# Patient Record
Sex: FEMALE | Race: WHITE | NOT HISPANIC OR LATINO | URBAN - METROPOLITAN AREA
[De-identification: names, ages, dates, MRNs, and addresses within clinical notes are randomized per-mention and may not be internally consistent; named-entity substitution may affect disease eponyms.]

---

## 2019-05-29 ENCOUNTER — OUTPATIENT (OUTPATIENT)
Dept: OUTPATIENT SERVICES | Facility: HOSPITAL | Age: 34
LOS: 1 days | End: 2019-05-29
Payer: COMMERCIAL

## 2019-05-29 VITALS
SYSTOLIC BLOOD PRESSURE: 114 MMHG | TEMPERATURE: 99 F | DIASTOLIC BLOOD PRESSURE: 76 MMHG | OXYGEN SATURATION: 99 % | HEART RATE: 82 BPM | HEIGHT: 64 IN | RESPIRATION RATE: 16 BRPM | WEIGHT: 145.95 LBS

## 2019-05-29 DIAGNOSIS — Z98.890 OTHER SPECIFIED POSTPROCEDURAL STATES: Chronic | ICD-10-CM

## 2019-05-29 DIAGNOSIS — N84.0 POLYP OF CORPUS UTERI: ICD-10-CM

## 2019-05-29 DIAGNOSIS — Z98.891 HISTORY OF UTERINE SCAR FROM PREVIOUS SURGERY: Chronic | ICD-10-CM

## 2019-05-29 DIAGNOSIS — Z01.818 ENCOUNTER FOR OTHER PREPROCEDURAL EXAMINATION: ICD-10-CM

## 2019-05-29 DIAGNOSIS — Q23.1 CONGENITAL INSUFFICIENCY OF AORTIC VALVE: ICD-10-CM

## 2019-05-29 LAB
HCG SERPL-ACNC: <1 MIU/ML — SIGNIFICANT CHANGE UP
HCT VFR BLD CALC: 42.1 % — SIGNIFICANT CHANGE UP (ref 34.5–45)
HGB BLD-MCNC: 14 G/DL — SIGNIFICANT CHANGE UP (ref 11.5–15.5)
MCHC RBC-ENTMCNC: 28.1 PG — SIGNIFICANT CHANGE UP (ref 27–34)
MCHC RBC-ENTMCNC: 33.3 GM/DL — SIGNIFICANT CHANGE UP (ref 32–36)
MCV RBC AUTO: 84.5 FL — SIGNIFICANT CHANGE UP (ref 80–100)
NRBC # BLD: 0 /100 WBCS — SIGNIFICANT CHANGE UP (ref 0–0)
PLATELET # BLD AUTO: 212 K/UL — SIGNIFICANT CHANGE UP (ref 150–400)
RBC # BLD: 4.98 M/UL — SIGNIFICANT CHANGE UP (ref 3.8–5.2)
RBC # FLD: 13.3 % — SIGNIFICANT CHANGE UP (ref 10.3–14.5)
WBC # BLD: 6.84 K/UL — SIGNIFICANT CHANGE UP (ref 3.8–10.5)
WBC # FLD AUTO: 6.84 K/UL — SIGNIFICANT CHANGE UP (ref 3.8–10.5)

## 2019-05-29 PROCEDURE — 86900 BLOOD TYPING SEROLOGIC ABO: CPT

## 2019-05-29 PROCEDURE — G0463: CPT

## 2019-05-29 PROCEDURE — 86850 RBC ANTIBODY SCREEN: CPT

## 2019-05-29 PROCEDURE — 36415 COLL VENOUS BLD VENIPUNCTURE: CPT

## 2019-05-29 PROCEDURE — 84702 CHORIONIC GONADOTROPIN TEST: CPT

## 2019-05-29 PROCEDURE — 85027 COMPLETE CBC AUTOMATED: CPT

## 2019-05-29 PROCEDURE — 86901 BLOOD TYPING SEROLOGIC RH(D): CPT

## 2019-05-29 NOTE — H&P PST ADULT - NSICDXPROBLEM_GEN_ALL_CORE_FT
PROBLEM DIAGNOSES  Problem: Polyp of corpus uteri  Assessment and Plan: scheduled for hysteroscopic polypectomy using myosure device on 5/31 with Dr. Kim    Problem: Pre-op evaluation  Assessment and Plan: Labs - CBC, HCG and T&S  No MC needed  Pre op and Hibiclens instructions reviewed and given. Take routine am meds DOS with sip of water. Avoid NSAIDs and OTC supplements. Verbalized understanding     Problem: Bicuspid aortic valve  Assessment and Plan: Stable, asymptomatic; Last Cardiac note with testing requested. PROBLEM DIAGNOSES  Problem: Bicuspid aortic valve  Assessment and Plan: Stable, asymptomatic; Last Cardiac note with testing requested.     Problem: Polyp of corpus uteri  Assessment and Plan: scheduled for hysteroscopic polypectomy using myosure device on 5/31 with Dr. Kim    Problem: Pre-op evaluation  Assessment and Plan: Labs - CBC, HCG and T&S  No MC needed  Pre op instructions reviewed and given. Take routine am meds DOS with sip of water. Avoid NSAIDs and OTC supplements. Verbalized understanding

## 2019-05-29 NOTE — H&P PST ADULT - NSICDXPASTSURGICALHX_GEN_ALL_CORE_FT
PAST SURGICAL HISTORY:  H/O bilateral breast reduction surgery     H/O  section     History of D&C Missed AB 2019    History of repair of ACL Left 2000

## 2019-05-29 NOTE — H&P PST ADULT - NSICDXPASTMEDICALHX_GEN_ALL_CORE_FT
PAST MEDICAL HISTORY:  Bicuspid aortic valve Asymptomatic; Followed annually by a cardiologist    GERD (gastroesophageal reflux disease)     Polyp of corpus uteri

## 2019-05-29 NOTE — H&P PST ADULT - ASSESSMENT
35 yo female with a uterine polyp scheduled for hysteroscopic polypectomy using myosure device on 5/31 with Dr. Kim

## 2019-05-29 NOTE — H&P PST ADULT - HISTORY OF PRESENT ILLNESS
35 yo female with a bicuspid aortic valve presents to [facility] scheduled for hysteroscopic polypectomy using myosure device on  with Dr. Kim.  LMP 3/11/2019. S/P missed AB x 2 in the last 6 months. Reports that she has a uterine polyp found by her infertility doctor during a gyn exam. Denies abnormal bleeding and discomfort.

## 2019-05-29 NOTE — H&P PST ADULT - NSANTHOSAYNRD_GEN_A_CORE
No. FERCHO screening performed.  STOP BANG Legend: 0-2 = LOW Risk; 3-4 = INTERMEDIATE Risk; 5-8 = HIGH Risk

## 2019-05-30 ENCOUNTER — TRANSCRIPTION ENCOUNTER (OUTPATIENT)
Age: 34
End: 2019-05-30

## 2019-05-30 RX ORDER — SODIUM CHLORIDE 9 MG/ML
1000 INJECTION, SOLUTION INTRAVENOUS
Refills: 0 | Status: DISCONTINUED | OUTPATIENT
Start: 2019-05-31 | End: 2019-05-31

## 2019-05-30 RX ORDER — ACETAMINOPHEN 500 MG
975 TABLET ORAL ONCE
Refills: 0 | Status: COMPLETED | OUTPATIENT
Start: 2019-05-31 | End: 2019-05-31

## 2019-05-31 ENCOUNTER — RESULT REVIEW (OUTPATIENT)
Age: 34
End: 2019-05-31

## 2019-05-31 ENCOUNTER — OUTPATIENT (OUTPATIENT)
Dept: OUTPATIENT SERVICES | Facility: HOSPITAL | Age: 34
LOS: 1 days | End: 2019-05-31
Payer: COMMERCIAL

## 2019-05-31 VITALS
TEMPERATURE: 99 F | DIASTOLIC BLOOD PRESSURE: 72 MMHG | SYSTOLIC BLOOD PRESSURE: 111 MMHG | HEART RATE: 67 BPM | OXYGEN SATURATION: 98 % | RESPIRATION RATE: 15 BRPM

## 2019-05-31 VITALS
WEIGHT: 145.95 LBS | TEMPERATURE: 98 F | HEART RATE: 61 BPM | OXYGEN SATURATION: 100 % | HEIGHT: 64 IN | SYSTOLIC BLOOD PRESSURE: 117 MMHG | DIASTOLIC BLOOD PRESSURE: 74 MMHG | RESPIRATION RATE: 14 BRPM

## 2019-05-31 DIAGNOSIS — Z98.890 OTHER SPECIFIED POSTPROCEDURAL STATES: Chronic | ICD-10-CM

## 2019-05-31 DIAGNOSIS — N84.0 POLYP OF CORPUS UTERI: ICD-10-CM

## 2019-05-31 DIAGNOSIS — Z98.891 HISTORY OF UTERINE SCAR FROM PREVIOUS SURGERY: Chronic | ICD-10-CM

## 2019-05-31 PROCEDURE — 88305 TISSUE EXAM BY PATHOLOGIST: CPT | Mod: 26

## 2019-05-31 PROCEDURE — 88305 TISSUE EXAM BY PATHOLOGIST: CPT

## 2019-05-31 PROCEDURE — 58558 HYSTEROSCOPY BIOPSY: CPT

## 2019-05-31 RX ORDER — HYDROMORPHONE HYDROCHLORIDE 2 MG/ML
0.5 INJECTION INTRAMUSCULAR; INTRAVENOUS; SUBCUTANEOUS
Refills: 0 | Status: DISCONTINUED | OUTPATIENT
Start: 2019-05-31 | End: 2019-05-31

## 2019-05-31 RX ORDER — SODIUM CHLORIDE 9 MG/ML
1000 INJECTION, SOLUTION INTRAVENOUS
Refills: 0 | Status: DISCONTINUED | OUTPATIENT
Start: 2019-05-31 | End: 2019-05-31

## 2019-05-31 RX ORDER — OXYCODONE HYDROCHLORIDE 5 MG/1
5 TABLET ORAL ONCE
Refills: 0 | Status: DISCONTINUED | OUTPATIENT
Start: 2019-05-31 | End: 2019-05-31

## 2019-05-31 RX ORDER — ONDANSETRON 8 MG/1
4 TABLET, FILM COATED ORAL ONCE
Refills: 0 | Status: DISCONTINUED | OUTPATIENT
Start: 2019-05-31 | End: 2019-05-31

## 2019-05-31 RX ADMIN — Medication 975 MILLIGRAM(S): at 12:47

## 2019-05-31 RX ADMIN — SODIUM CHLORIDE 100 MILLILITER(S): 9 INJECTION, SOLUTION INTRAVENOUS at 15:34

## 2019-05-31 RX ADMIN — HYDROMORPHONE HYDROCHLORIDE 0.5 MILLIGRAM(S): 2 INJECTION INTRAMUSCULAR; INTRAVENOUS; SUBCUTANEOUS at 15:36

## 2019-05-31 RX ADMIN — SODIUM CHLORIDE 75 MILLILITER(S): 9 INJECTION, SOLUTION INTRAVENOUS at 12:48

## 2019-05-31 NOTE — BRIEF OPERATIVE NOTE - NSICDXBRIEFPROCEDURE_GEN_ALL_CORE_FT
PROCEDURES:  Hysteroscopic polypectomy using MyoSure tissue removal system 31-May-2019 15:44:32  Александр Kim

## 2019-05-31 NOTE — ASU DISCHARGE PLAN (ADULT/PEDIATRIC) - CARE PROVIDER_API CALL
Александр Kim)  Obstetrics and Gynecology; Reproductive EndoInfertility  [address] [place] [address]  Phone: [phone]  Fax: [phone]  Follow Up Time:

## 2019-06-04 LAB — SURGICAL PATHOLOGY STUDY: SIGNIFICANT CHANGE UP

## 2019-07-22 NOTE — ASU PREOP CHECKLIST - WEIGHT IN KG
Received fax from [facility]- (PCP Dr Hyatt)- pt seen & put on Suprenza (Phentermine)  30 mg (ODT form) which is not available.  The Phentermine is available in capsules.  Is it ok to switch to capsules?  Last seen by Dr Hyatt 7/17/19.  Next appointment scheduled 10/21/19.  
Yes, prescription approved.  
66.2

## 2019-12-31 NOTE — H&P PST ADULT - SOURCE OF INFORMATION, PROFILE
[facility], [place]  [address] [place] [address]  [phone]  Date:    Dec 31, 2019  Provider:  Edyta Bangura MD  Patient:   Navya William    :  1970    Age:  49 Years old    Sex:  Female    PROCEDURE REPORT    Chief Complaint:   The patient is a 49 year old female with the chief complaint of: back pain.    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    Indications / Pre-Operative Plan:  Patient is on opioids and reports taking them as prescribed. Patient is able to function and complete activities of daily living. Patient displays no aberrant behaviors. Patient denies side effects.   The patient has persistent axial, non radicular pain with paraspinal tenderness, and has failed a variety of conservative therapeutic interventions including activity modification, physical therapy, and medications. The patient has had diagnostic response with greater than 100% relief and improvement in function for the duration of the two comparative anesthetic medial branch blocks and presents for therapeutic Radiofrequency medial branch neurolysis. The risks, alternatives and benefits were explained to the patient in detail.  The patient agreed with the plan.   Patient was examined by me prior to the procedure. Examination of heart, lung and mental status were all within acceptable limits. The patient has been assessed, examined and cleared for the planned procedure and level of anesthesia in an ambulatory surgery center.    Description of Procedure:    RF Lumbar Facet  Denervation  After obtaining informed consent including discussion of risks, benefits and alternatives, the patient was brought to the procedure room. The patient was placed in the prone position. Appropriate time out was called. The area was prepped and draped in a sterile fashion. Utilizing fluoroscopy the target level(s) were identified and made prominent. After anesthetizing the skin and subcutaneous tissues with 2 ml of 1% Lidocaine, a 18 gauge radiofrequency cannula measuring 15 cm in length with 10 mm active tip  was placed at the base of the superior articular process at right L1 level, which was confirmed under AP, oblique and lateral views.    Same procedure outlined above was performed at right L2, L3 levels. No paresthesia(s) was/were noted at any of the levels. Physiological sensorimotor stimulation was applied, and it was felt in the back without any sensorimotor stimulation being felt in the lower extremity. Subsequently, 1 ml of 2% of lidocaine was injected at each target point. Prior to performing the RF denervation, cannula placement was reconfirmed under fluoroscopic guidance. Lateral, PA and oblique views confirmed no impingement of neuroforamen(a). RF neurotomy was performed at each level at 80 degrees centigrade for 90 seconds. 1 more lesion was performed at each level by slightly repositioning the cannula to complete the neurotomy at these levels.    Radio frequency neurolysis was performed on right T12, L1, L2 medial/dorsal branch nerves. All the needles were withdrawn, and there were no noted complications. Patient tolerated the procedure well and was transported / observed before being discharged in satisfactory condition.      Post-Operative Plan:  Today I performed a right T12, L1, L2 Lumbar radiofrequency ablation. We will repeat on the left side 1-2 weeks if clinically indicated. Already scheduled on 1/7/19.     UDS 10/16/19 results consistent    Continue Hydrocodone 7.5/325 mg 1 tablet  BID PRN. Refill given today x 1 month.    Continue Tramadol 50 mg take 1-2 tablets TID  Maximum 6 tablets/day. Refill given today x 1 month    Did see Dr. Winston for consult-no surgical intervention    Uses OTC Ibuprofen    Risks/benefits/alternatives discussed with patient. Patient agrees with outlined plan. Patients questions/concerns answered at this time and patient will contact us with any further questions.    IHaily Bruno Medical scribe, scribed the procedure, assessment and plan in the EHR for Edyta Bangura MD who saw, evaluated and developed the plan of care.    Patient has been prescribed the following medication(s) today:  Medication Description Start Date Stop Date   tramadol 50 mg tablet take 1-2 tablet by oral route  every 6 hours as needed MAX 6day 12/31/2019 01/29/2020   hydrocodone 7.5 mg-acetaminophen 325 mg tablet take 1 tablet by oral route  every 12 hours as needed for pain 12/31/2019 01/29/2020     .    Edyta Bangura MD        Electronically signed by Edyta Bangura MD on 12/31/2019 09:36 AM     patient

## 2020-07-27 ENCOUNTER — EMERGENCY (EMERGENCY)
Facility: HOSPITAL | Age: 35
LOS: 1 days | Discharge: HOME | End: 2020-07-27
Attending: EMERGENCY MEDICINE | Admitting: EMERGENCY MEDICINE
Payer: COMMERCIAL

## 2020-07-27 VITALS
TEMPERATURE: 98 F | OXYGEN SATURATION: 98 % | RESPIRATION RATE: 16 BRPM | SYSTOLIC BLOOD PRESSURE: 115 MMHG | DIASTOLIC BLOOD PRESSURE: 78 MMHG | HEART RATE: 78 BPM

## 2020-07-27 VITALS
DIASTOLIC BLOOD PRESSURE: 83 MMHG | HEART RATE: 80 BPM | HEIGHT: 64 IN | SYSTOLIC BLOOD PRESSURE: 117 MMHG | RESPIRATION RATE: 16 BRPM | WEIGHT: 149.91 LBS | OXYGEN SATURATION: 98 % | TEMPERATURE: 98 F

## 2020-07-27 DIAGNOSIS — Z98.890 OTHER SPECIFIED POSTPROCEDURAL STATES: Chronic | ICD-10-CM

## 2020-07-27 DIAGNOSIS — Z98.891 HISTORY OF UTERINE SCAR FROM PREVIOUS SURGERY: Chronic | ICD-10-CM

## 2020-07-27 PROCEDURE — 70450 CT HEAD/BRAIN W/O DYE: CPT | Mod: 26

## 2020-07-27 PROCEDURE — 99284 EMERGENCY DEPT VISIT MOD MDM: CPT

## 2020-07-27 PROCEDURE — 99284 EMERGENCY DEPT VISIT MOD MDM: CPT | Mod: 25

## 2020-07-27 PROCEDURE — 70450 CT HEAD/BRAIN W/O DYE: CPT

## 2020-07-27 NOTE — ED ADULT NURSE NOTE - NSHOSCREENINGQ1_ED_ALL_ED
Instructed patient/caregiver to follow-up with primary care physician/Care for catheter as per unit/ICU protocols/Verbal/written post procedure instructions were given to patient/caregiver/Keep the cast/splint/dressing clean and dry No

## 2020-07-27 NOTE — ED ADULT NURSE NOTE - CHPI ED NUR SYMPTOMS NEG
bumped/no blurred vision/no change in level of consciousness/no confusion/no dizziness/no loss of consciousness/no nausea/no seizure/no syncope/no vomiting/no weakness

## 2020-07-27 NOTE — ED PROVIDER NOTE - CHPI ED SYMPTOMS NEG
no blurred vision/no confusion/no dizziness/no loss of consciousness/no nausea/no vomiting/no weakness/no change in level of consciousness

## 2020-07-27 NOTE — ED ADULT NURSE NOTE - OBJECTIVE STATEMENT
pt comes to ED s/p hitting head around 12 noon today on door frame, pt with hx of headaches, pt states she was on the phone wit her husabnd when she noticed her words satrted getting confused, not making sense, lasted about 2 min, my right hand got numb and tingly, I am 22 weeks pregnant " pt comes to ED s/p hitting head around 12 noon today on door frame, pt with hx of headaches, pt states she was on the phone wit her  when she noticed to have difficulty finding her words, getting confused, not making sense, lasted about 2 min, she sat down and noticed her  right hand got numb and tingly, pt is 22 weeks pregnant, no other health issues, states the symptoms resolved shortly after, feels like she is at her baseline now.

## 2020-07-27 NOTE — ED PROVIDER NOTE - PATIENT PORTAL LINK FT
You can access the FollowMyHealth Patient Portal offered by [facility] by registering at the following website: [email or web address]. By joining [email or web address]’s FollowMyHealth portal, you will also be able to view your health information using other applications (apps) compatible with our system.

## 2020-07-27 NOTE — ED PROVIDER NOTE - CLINICAL SUMMARY MEDICAL DECISION MAKING FREE TEXT BOX
pt with head injury with loc. probable concussion. pt with headaches since pregnant. discussed need for ct scan to r/o ich. discussed risks vs benefits. pt advised she spoke to her obgyn and would like ct. will do ct and re-eval

## 2020-07-27 NOTE — ED ADULT NURSE REASSESSMENT NOTE - NS ED NURSE REASSESS COMMENT FT1
pt aware of risks with getting a CT Scan while pregnant. pt wishes to continue with test, consent form signed.

## 2020-07-27 NOTE — ED ADULT NURSE NOTE - PMH
Bicuspid aortic valve  Asymptomatic; Followed annually by a cardiologist  GERD (gastroesophageal reflux disease)    Polyp of corpus uteri

## 2020-07-27 NOTE — ED PROVIDER NOTE - PROGRESS NOTE DETAILS
pt given head injury precautions. advised neuro follow up. All imaging reviewed. all results reviewed with pt including abnormal results. pt given a copy of results. pt advised to follow up with pmd regarding abnormal results. All questions answered and concerns addressed. pt verbalized understanding and agreement with plan and dx. pt advised on next step and when/where to follow up. pt advised on all take home and otc medications. pt advised to follow up with PMD. pt advised to return to ed for worsenng symptoms including fever, cp, sob. will dc.

## 2020-07-27 NOTE — ED ADULT TRIAGE NOTE - CHIEF COMPLAINT QUOTE
" I had a headache x 2 days, but I bumped my head on the door frame today, I started getting my words confused, not making sense, lasted about 2 min, my right hand got numb and tingly, I am 22 weeks pregnant "

## 2020-07-27 NOTE — ED PROVIDER NOTE - NSFOLLOWUPINSTRUCTIONS_ED_ALL_ED_FT
Concussion    WHAT YOU NEED TO KNOW:    A concussion is a mild brain injury. It is usually caused by a bump or blow to the head from a fall, a motor vehicle crash, or a sports injury. Sometimes being shaken forcefully may cause a concussion.    DISCHARGE INSTRUCTIONS:    Have someone call 911 for any of the following:     Someone tries to wake you and cannot do so.      You have a seizure, increasing confusion, or a change in personality.      Your speech becomes slurred, or you have new vision problems.    Return to the emergency department if:     You have sudden and new vision problems.      You have a severe headache that does not go away.      You have arm or leg weakness, numbness, or new problems with coordination.      You have blood or clear fluid coming out of the ears or nose.    Contact your healthcare provider if:     You have nausea or are vomiting.      You feel more sleepy than usual.      Your symptoms get worse.      Your symptoms last longer than 6 weeks after the injury.      You have questions or concerns about your condition or care.    Medicines: You may need any of the following:     Acetaminophen decreases pain and fever. It is available without a doctor's order. Ask how much to take and how often to take it. Follow directions. Read the labels of all other medicines you are using to see if they also contain acetaminophen, or ask your doctor or pharmacist. Acetaminophen can cause liver damage if not taken correctly. Do not use more than 4 grams (4,000 milligrams) total of acetaminophen in one day.       NSAIDs help decrease swelling and pain or fever. This medicine is available with or without a doctor's order. NSAIDs can cause stomach bleeding or kidney problems in certain people. If you take blood thinner medicine, always ask your healthcare provider if NSAIDs are safe for you. Always read the medicine label and follow directions.      Take your medicine as directed. Contact your healthcare provider if you think your medicine is not helping or if you have side effects. Tell him or her if you are allergic to any medicine. Keep a list of the medicines, vitamins, and herbs you take. Include the amounts, and when and why you take them. Bring the list or the pill bottles to follow-up visits. Carry your medicine list with you in case of an emergency.    Self-care: Concussion symptoms usually go away within about 10 days, but they may last longer. The following may be recommended to manage your symptoms:     Rest from physical and mental activities as directed. Mental activities are those that require thinking, concentration, and attention. You will need to rest until your symptoms are gone. Rest will allow you to recover from your concussion. Ask your healthcare provider when you can return to work and other daily activities.      Have someone stay with you for the first 24 hours after your injury. Your healthcare provider should be contacted if your symptoms get worse, or you develop new symptoms.      Do not participate in sports and physical activities until your healthcare provider says it is okay. They could make your symptoms worse or lead to another concussion. Your healthcare provider will tell you when it is okay for you to return to sports or physical activities. Ask for more information about sports concussions.    Prevent another concussion:     Wear protective sports equipment that fits properly. Helmets help decrease your risk for a serious brain injury. Talk to your healthcare provider about ways you can decrease your risk for a concussion if you play sports.      Wear your seatbelt every time you travel. This helps to decrease your risk for a head injury if you are in a car accident.     Follow up with your healthcare provider as directed: Write down your questions so you remember to ask them during your visits.     FOLLOW UP EVALUATION  To ensure optimal concussion recovery, follow up with a doctor specialized in concussion management. An evaluation by a specialty concussion program can ensure timely return to activities.    We offer appointments through the [facility]’s Hotline at [phone].

## 2020-07-27 NOTE — ED PROVIDER NOTE - ATTENDING CONTRIBUTION TO CARE
36 yo F 22 wks pregnant co feeling headache and dazed after bumping forehead on door today. Felt numbness in rt hand earlier now resolved. Feels well.. She spoke with her OB who recommended ER eval. Denies headache, fever, NV, visual disturbance or other symptom. Hx of migraines with scotoma which she had today.  PE VSS, afebrile, no distress. HEENT atraumatic, no scalp tenderness, neck supple, abd soft, nontender, neuro a=ox3, CN 2 - 12 intact. No sensory motor deficits. DTRs +2 thruout.   Gait normal.   Plan - pt unsure if she wants CT scan of brain. Will confer with her  and OBGYN.     I performed a history and physical exam of the patient and discussed their management with the advanced care provider. I reviewed the advanced care provider's note and agree with the documented findings and plan of care. My medical decision making and objective findings are found above.

## 2020-07-27 NOTE — ED ADULT NURSE NOTE - PSH
H/O bilateral breast reduction surgery    H/O  section  2017  History of D&C  Missed AB 2019  History of repair of ACL  Left 2000

## 2020-07-27 NOTE — ED PROVIDER NOTE - CARE PROVIDER_API CALL
Santino Hamm  NEUROLOGY  [address] [place] [id number]  Phone: [phone]  Fax: [phone]  Follow Up Time:

## 2020-07-27 NOTE — ED PROVIDER NOTE - OBJECTIVE STATEMENT
pt is a 36yo female 22w pregnant with no significant pmhx presents s/p head injury. pt reports she hit herself in the pt is a 36yo female 22w pregnant with no significant pmhx presents s/p head injury. pt reports she hit herself in the head with a door without loc. pt reports after incident she got "very nervous" and possibly started slurring her words with right arm tingling that quickly resolved. pt reports she called her ob who advised her to come to ed for eval. pt reports she has been having frequent headaches since pregnancy. pt did not take anything for symptoms.

## 2020-12-23 ENCOUNTER — TRANSCRIPTION ENCOUNTER (OUTPATIENT)
Age: 35
End: 2020-12-23

## 2021-02-15 NOTE — H&P PST ADULT - NSANTHNECKRD_ENT_A_CORE
Patient:   NEHEMIAS HAINES            MRN: [id number]            FIN: [id number]               Age:   87 years     Sex:  Female     :  1931   Associated Diagnoses:   None   Author:   Everton Salgado MD      History of Present Illness   87y F presents with chest pain. Pt reports intermittent CP since april. Tried to control it with nitro. Required two nitros today, 4 yesterday. Due for checkup today and then sent in. No active pain. Pain rates \"bad\", in her chest to the back, radiates to arms. No SOB. SOB in general with activity, at baseline. Pt is supposed to have  a heart cath.       Review of Systems   Constitutional symptoms:  No fever,    Skin symptoms:  No rash,    Eye symptoms:  No recent vision problems,    ENMT symptoms:  No sore throat,    Respiratory symptoms:  No shortness of breath,    Cardiovascular symptoms:  No chest pain,    Gastrointestinal symptoms:  No abdominal pain,    Genitourinary symptoms:  No dysuria,    Musculoskeletal symptoms:  No Joint pain,    Neurologic symptoms:  No headache,       Health Status   Allergies:    Allergic Reactions (Selected)  Medium  Cipro- Rash.  Severity Not Documented  Amoxicillin- No reactions were documented.  Biaxin- No reactions were documented.  Celebrex- No reactions were documented.  Claritin-D- No reactions were documented.  Doxycycline- No reactions were documented.  Imdur- No reactions were documented.  Medrol- No reactions were documented.  Nonallergic Reactions (Selected)  Mild  Demerol- Rash.  Severity Not Documented  Codeine- Makes me sick.  Levaquin- Nausea and n..   Medications:  (Selected)   Documented Medications  Documented  Caltrate 600 + D oral tablet: 1 tab, PO, BID  DuoNeb 2.5-0.5 mg/3 mL inhalation solution: 3 mL, Inhalation, q4hr, q4 to 6 hour prn (acute bronchitis), PRN: as needed for shortness of breath or wheezing  Eliquis 5 mg oral tablet: 5 mg, 1 tab, PO, BID, at 0600 and 1700  Flovent  mcg/inh inhaler: 1 puff,  INHL, BID  Lumigan 0.01% ophthalmic solution: 1 gtt(s), Left Eye, qHS  MiraLax 17 g oral powder: 17 g, PO, qAM, PRN: Constipation  Mucinex 600 mg ER oral tablet: 600 mg, 1 tab, PO, q12hrS  Norco 325 mg-5 mg oral tablet: 1 tab, PO, qHS, PRN: for pain, 0 Refill(s)  Nystop 100,000 units/g topical powder: 1 yuki, TOP, qDay, under the both breast and groin, PRN: Rash  Tikosyn 125 mcg oral capsule: 125 mcg, 1 cap, PO, BID, Home maintenance medication: at 0800 & 1700  Toprol-XL 50 mg oral ER tablet: 50 mg, 1 tab, PO, qAM  Tylenol 325 mg oral tablet: 650 mg, 2 tab, PO, q12hr, PRN: Pain / Fever  Urecholine 10 mg oral tablet: 10 mg, 1 tab, PO, TIDAC  Ventolin HFA 90 mcg/inh inhalation aerosol: 2 puff, Inhalation, q6hr, PRN: for wheezing  atorvastatin 40 mg oral tablet: 40 mg, 1 tab, PO, qPM  clindamycin 150 mg oral capsule: 300 mg, 2 cap, PO, BID, indefinite prophylaxis  famotidine 20 mg oral tablet: 20 mg, 1 tab, PO, qDayACB  ferrous sulfate 325 mg oral tablet: 325 mg, 1 tab, PO, qDayPCB  furosemide 20 mg oral tablet: 20 mg, 1 tab, PO, qDayACB  levothyroxine 137 mcg (0.137 mg) oral tablet: 137 mcg, 1 tab, PO, qDayACB  loperamide 2 mg oral tablet: 2 mg, 1 tab, PO, q4hr, not to exceed 8 capsules, or 16 mg, in 24 hours, PRN: Loose stools.      Past Medical/ Family/ Social History      Medical history   Reviewed as documented in chart.   Surgical history: Reviewed as documented in chart, cardiac stents.   Social history: Tobacco use: Denies.   Problem list:    Active Problems (11)  Anemia   Asthma   Atrial fibrillation   Congestive heart failure   GERD (gastroesophageal reflux disease)   Hip replacement   HTN (hypertension)   Hyperlipidemia   Osteoporosis   SOB (shortness of breath) on exertion   Thyroid activity decreased   .      Physical Examination               Vital Signs   Vital Signs   6/24/2019 11:21          Temperature Oral          98.4 F                             Peripheral Pulse Rate     61 bpm                              Respiratory Rate          18 br/min                             Systolic Blood Pressure   143 mmHg  HI                             Diastolic Blood Pressure  62 mmHg                             Mean Arterial Pressure    89 mmHg                             Oxygen Saturation         99 %  .   Measurements   6/24/2019 11:21          Height                    170.0 cm                             Weight                    123.0 kg  .               Per nurse's notes.   Vital Signs were reviewed.   Pulse Ox > 95% on Room Air which is normal for this patient.   General:  Alert, no acute distress.    Skin:  Warm, dry.    Head:  Normocephalic, atraumatic.    Neck:  Supple, trachea midline.    Eye:  Pupils are equal, round and reactive to light, extraocular movements are intact.    Ears, nose, mouth and throat:  Oral mucosa moist.   Cardiovascular:  Regular rate and rhythm, S1, S2, UE pulses sym.    Respiratory:  Lungs are clear to auscultation, respirations are non-labored, Symmetrical chest wall expansion.    Gastrointestinal:  Soft, Nontender, Non distended.    Musculoskeletal:  Normal ROM, no swelling, no deformity.    Neurological:  Alert and oriented to person, place, time, and situation, No focal neurological deficit observed.    Psychiatric:  Appropriate mood & affect.      Medical Decision Making   Differential Diagnosis:  Differential diagnoses to consider in this patient include: cardiac (ischemia, valve disease, arrhythmia) vs pulmonary (infectious process, pulmonary embolus) vs gastrointestinal (reflux) vs musculoskeletal vs neuropathic vs other process..   Electrocardiogram:  EKG shows normal sinus rhythm, normal axis, with non-specific ST changes interpreted by myself.   .   Cardiac monitor:  Rhythm strip analyzed by myself shows sinus rhythm, normal rate, with no ectopy..   Results review:  Lab results : Lab View   6/24/2019 11:49          BNP                       291 pg/mL  HI    6/24/2019 11:29           Glucose Lvl               103 mg/dL  HI                             BUN                       19 mg/dL                             Creatinine                0.88 mg/dL                             eGFR AfrAmer              >60 mL/min/1.73m2  NA                             eGFR NonAfrAmer           60 mL/min/1.73m2  NA                             Sodium                    130 mEq/L  LOW                             Potassium                 5.0 mEq/L                             Chloride                  96 mEq/L  LOW                             TCO2                      23 mEq/L                             AGAP                      16 mEq/L                             Calcium                   10.1 mg/dL                             Alk Phos                  107 unit/L                             Bili Total                0.5 mg/dL                             Total Protein             7.8 g/dL                             Albumin                   3.7 g/dL                             Globulin_                 4.1 g/dL                             A/G Ratio_                0.9  NA                             AST/GOT                   23 unit/L                             ALT/GPT                   13 unit/L                             Troponin I                0.36 ng/mL  CRIT                             WBC                       12.0 K/cumm  HI                             RBC                       4.73 M/cumm                             Hgb                       13.8 g/dL                             Hct                       42 %                             MCV                       89 FL                             MCH                       29 pg                             MCHC                      33 g/dL                             RDW                       13.3 %                             Platelet                  291 K/cumm                             NRBC                      0.0 %                              Abs Neutro                8.9 K/cumm  HI                             Neutrophil                74 %  NA                             Abs Lymph                 1.4 K/cumm                             Lymphocyte                12 %  NA                             Abs Mono                  1.3 K/cumm  HI                             Monocyte                  11 %  NA                             Immature Gran             0.3 %                             Eosinophil                2 %                             Basophil                  1 %  .      Reexamination/ Reevaluation   Discussed with Dr. Whitlock prior to arrival. Pt wiht several months of cp. Using nitro. No pain in clinic but ekg concerning for MI. Did not want cath lab activation given lack of CP currently.  Labs generally unremarkable. No leukocytosis, no significant electrolyte abnormalities.  Elevated trop. Reviewed cxr.  Discussed with cards, advised heparin, no bolus. ASpirin. Will cath tomorrow.  Pt was discussed with admitting physician/team. Admitted for further care.          Impression and Plan   Diagnosis   1. NSTEMI   Plan   Condition: Stable.    Disposition: Admit time  6/24/2019 12:50:00, Admit to Inpatient Unit.             Electronically Signed On 06.24.2019 12:55  ___________________________________________________   Naomi ALVARADO, Everton Campo     No

## 2021-04-27 DIAGNOSIS — I49.3 VENTRICULAR PREMATURE DEPOLARIZATION: ICD-10-CM

## 2021-04-27 DIAGNOSIS — R06.02 SHORTNESS OF BREATH: ICD-10-CM

## 2021-04-27 DIAGNOSIS — N84.0 POLYP OF CORPUS UTERI: ICD-10-CM

## 2021-04-28 ENCOUNTER — APPOINTMENT (OUTPATIENT)
Dept: CARDIOLOGY | Facility: CLINIC | Age: 36
End: 2021-04-28
Payer: COMMERCIAL

## 2021-04-28 ENCOUNTER — NON-APPOINTMENT (OUTPATIENT)
Age: 36
End: 2021-04-28

## 2021-04-28 VITALS
OXYGEN SATURATION: 99 % | BODY MASS INDEX: 24.75 KG/M2 | DIASTOLIC BLOOD PRESSURE: 74 MMHG | WEIGHT: 145 LBS | HEIGHT: 64 IN | HEART RATE: 70 BPM | SYSTOLIC BLOOD PRESSURE: 109 MMHG

## 2021-04-28 PROCEDURE — 93000 ELECTROCARDIOGRAM COMPLETE: CPT

## 2021-04-28 PROCEDURE — 99072 ADDL SUPL MATRL&STAF TM PHE: CPT

## 2021-04-28 PROCEDURE — 99205 OFFICE O/P NEW HI 60 MIN: CPT

## 2021-04-28 NOTE — REVIEW OF SYSTEMS
[SOB] : shortness of breath [Dyspnea on exertion] : dyspnea during exertion [Palpitations] : palpitations

## 2021-05-24 NOTE — HISTORY OF PRESENT ILLNESS
[FreeTextEntry1] : Ms. Spence is a 36 yr old F with h/o bicuspid AV presents in to establish care in the [facility] program in setting of palpitations - s/p recent  2020 - with recent worsening palpitations\par \par During the last pregnnacy had 2 ER visits for possible ?TIAs/CVAs - negative work up. As per neurologist he thought it was pregnancy related migraines\par \par  She carries a past medical history of bicuspid aortic valve with mild AR, holter detected PVC, ACL repair, \par 2017- TTE showed bicuspid Aortic valve with moderate AR, no evidence of aortic coarctation. \par           Normal  in 2017

## 2021-05-24 NOTE — DISCUSSION/SUMMARY
[FreeTextEntry1] : \par Ms. Spence is a 36 yr old F with h/o bicuspid AV presents in to establish care in the [facility] program in setting of palpitations - s/p recent  2020 - with recent worsening palpitations\par \par During the last pregnnacy had 2 ER visits for possible ?TIAs/CVAs - negative work up. As per neurologist he thought it was pregnancy related migraines\par \par  She carries a past medical history of bicuspid aortic valve with mild AR, holter detected PVC, ACL repair, \par 2017- TTE showed bicuspid Aortic valve with moderate AR, no evidence of aortic coarctation. \par           Normal  in 2017 \par echo with bubbles\par cardiac monitor\par exercise stress test

## 2021-06-07 DIAGNOSIS — Z01.818 ENCOUNTER FOR OTHER PREPROCEDURAL EXAMINATION: ICD-10-CM

## 2021-06-08 ENCOUNTER — APPOINTMENT (OUTPATIENT)
Dept: DISASTER EMERGENCY | Facility: CLINIC | Age: 36
End: 2021-06-08

## 2021-06-09 LAB — SARS-COV-2 N GENE NPH QL NAA+PROBE: NOT DETECTED

## 2021-06-11 ENCOUNTER — APPOINTMENT (OUTPATIENT)
Dept: CV DIAGNOSITCS | Facility: HOSPITAL | Age: 36
End: 2021-06-11

## 2021-06-11 ENCOUNTER — APPOINTMENT (OUTPATIENT)
Dept: CV DIAGNOSTICS | Facility: HOSPITAL | Age: 36
End: 2021-06-11

## 2021-06-11 ENCOUNTER — OUTPATIENT (OUTPATIENT)
Dept: OUTPATIENT SERVICES | Facility: HOSPITAL | Age: 36
LOS: 1 days | End: 2021-06-11
Payer: COMMERCIAL

## 2021-06-11 DIAGNOSIS — Z98.890 OTHER SPECIFIED POSTPROCEDURAL STATES: Chronic | ICD-10-CM

## 2021-06-11 DIAGNOSIS — Q23.1 CONGENITAL INSUFFICIENCY OF AORTIC VALVE: ICD-10-CM

## 2021-06-11 DIAGNOSIS — Z98.891 HISTORY OF UTERINE SCAR FROM PREVIOUS SURGERY: Chronic | ICD-10-CM

## 2021-06-11 PROCEDURE — 93306 TTE W/DOPPLER COMPLETE: CPT

## 2021-06-11 PROCEDURE — 93306 TTE W/DOPPLER COMPLETE: CPT | Mod: 26

## 2021-06-11 PROCEDURE — 93016 CV STRESS TEST SUPVJ ONLY: CPT

## 2021-06-11 PROCEDURE — 93018 CV STRESS TEST I&R ONLY: CPT

## 2021-06-11 PROCEDURE — 93017 CV STRESS TEST TRACING ONLY: CPT

## 2021-06-14 ENCOUNTER — APPOINTMENT (OUTPATIENT)
Dept: CV DIAGNOSTICS | Facility: HOSPITAL | Age: 36
End: 2021-06-14

## 2021-06-14 ENCOUNTER — APPOINTMENT (OUTPATIENT)
Dept: CV DIAGNOSITCS | Facility: HOSPITAL | Age: 36
End: 2021-06-14

## 2021-07-19 ENCOUNTER — TRANSCRIPTION ENCOUNTER (OUTPATIENT)
Age: 36
End: 2021-07-19

## 2021-08-06 ENCOUNTER — TRANSCRIPTION ENCOUNTER (OUTPATIENT)
Age: 36
End: 2021-08-06

## 2021-08-09 ENCOUNTER — TRANSCRIPTION ENCOUNTER (OUTPATIENT)
Age: 36
End: 2021-08-09

## 2021-09-24 ENCOUNTER — TRANSCRIPTION ENCOUNTER (OUTPATIENT)
Age: 36
End: 2021-09-24

## 2021-09-29 ENCOUNTER — EMERGENCY (EMERGENCY)
Facility: HOSPITAL | Age: 36
LOS: 1 days | Discharge: HOME | End: 2021-09-29
Attending: EMERGENCY MEDICINE | Admitting: EMERGENCY MEDICINE
Payer: COMMERCIAL

## 2021-09-29 VITALS
OXYGEN SATURATION: 97 % | DIASTOLIC BLOOD PRESSURE: 86 MMHG | TEMPERATURE: 98 F | SYSTOLIC BLOOD PRESSURE: 129 MMHG | HEART RATE: 78 BPM | WEIGHT: 139.99 LBS | RESPIRATION RATE: 22 BRPM | HEIGHT: 64 IN

## 2021-09-29 VITALS
RESPIRATION RATE: 20 BRPM | SYSTOLIC BLOOD PRESSURE: 106 MMHG | OXYGEN SATURATION: 97 % | TEMPERATURE: 98 F | HEART RATE: 79 BPM | DIASTOLIC BLOOD PRESSURE: 72 MMHG

## 2021-09-29 DIAGNOSIS — Z98.890 OTHER SPECIFIED POSTPROCEDURAL STATES: Chronic | ICD-10-CM

## 2021-09-29 DIAGNOSIS — Z98.891 HISTORY OF UTERINE SCAR FROM PREVIOUS SURGERY: Chronic | ICD-10-CM

## 2021-09-29 LAB
ALBUMIN SERPL ELPH-MCNC: 3.7 G/DL — SIGNIFICANT CHANGE UP (ref 3.3–5)
ALP SERPL-CCNC: 41 U/L — SIGNIFICANT CHANGE UP (ref 30–120)
ALT FLD-CCNC: 28 U/L DA — SIGNIFICANT CHANGE UP (ref 10–60)
ANION GAP SERPL CALC-SCNC: 8 MMOL/L — SIGNIFICANT CHANGE UP (ref 5–17)
AST SERPL-CCNC: 17 U/L — SIGNIFICANT CHANGE UP (ref 10–40)
BASOPHILS # BLD AUTO: 0.04 K/UL — SIGNIFICANT CHANGE UP (ref 0–0.2)
BASOPHILS NFR BLD AUTO: 0.6 % — SIGNIFICANT CHANGE UP (ref 0–2)
BILIRUB SERPL-MCNC: 0.5 MG/DL — SIGNIFICANT CHANGE UP (ref 0.2–1.2)
BUN SERPL-MCNC: 11 MG/DL — SIGNIFICANT CHANGE UP (ref 7–23)
CALCIUM SERPL-MCNC: 8.5 MG/DL — SIGNIFICANT CHANGE UP (ref 8.4–10.5)
CHLORIDE SERPL-SCNC: 107 MMOL/L — SIGNIFICANT CHANGE UP (ref 96–108)
CO2 SERPL-SCNC: 26 MMOL/L — SIGNIFICANT CHANGE UP (ref 22–31)
CREAT SERPL-MCNC: 0.7 MG/DL — SIGNIFICANT CHANGE UP (ref 0.5–1.3)
D DIMER BLD IA.RAPID-MCNC: <150 NG/ML DDU — SIGNIFICANT CHANGE UP
EOSINOPHIL # BLD AUTO: 0.09 K/UL — SIGNIFICANT CHANGE UP (ref 0–0.5)
EOSINOPHIL NFR BLD AUTO: 1.4 % — SIGNIFICANT CHANGE UP (ref 0–6)
GLUCOSE SERPL-MCNC: 114 MG/DL — HIGH (ref 70–99)
HCG UR QL: NEGATIVE — SIGNIFICANT CHANGE UP
HCT VFR BLD CALC: 40.6 % — SIGNIFICANT CHANGE UP (ref 34.5–45)
HGB BLD-MCNC: 13.6 G/DL — SIGNIFICANT CHANGE UP (ref 11.5–15.5)
IMM GRANULOCYTES NFR BLD AUTO: 0.2 % — SIGNIFICANT CHANGE UP (ref 0–1.5)
LYMPHOCYTES # BLD AUTO: 2.39 K/UL — SIGNIFICANT CHANGE UP (ref 1–3.3)
LYMPHOCYTES # BLD AUTO: 37.9 % — SIGNIFICANT CHANGE UP (ref 13–44)
MCHC RBC-ENTMCNC: 30.6 PG — SIGNIFICANT CHANGE UP (ref 27–34)
MCHC RBC-ENTMCNC: 33.5 GM/DL — SIGNIFICANT CHANGE UP (ref 32–36)
MCV RBC AUTO: 91.2 FL — SIGNIFICANT CHANGE UP (ref 80–100)
MONOCYTES # BLD AUTO: 0.37 K/UL — SIGNIFICANT CHANGE UP (ref 0–0.9)
MONOCYTES NFR BLD AUTO: 5.9 % — SIGNIFICANT CHANGE UP (ref 2–14)
NEUTROPHILS # BLD AUTO: 3.4 K/UL — SIGNIFICANT CHANGE UP (ref 1.8–7.4)
NEUTROPHILS NFR BLD AUTO: 54 % — SIGNIFICANT CHANGE UP (ref 43–77)
NRBC # BLD: 0 /100 WBCS — SIGNIFICANT CHANGE UP (ref 0–0)
PLATELET # BLD AUTO: 203 K/UL — SIGNIFICANT CHANGE UP (ref 150–400)
POTASSIUM SERPL-MCNC: 3.8 MMOL/L — SIGNIFICANT CHANGE UP (ref 3.5–5.3)
POTASSIUM SERPL-SCNC: 3.8 MMOL/L — SIGNIFICANT CHANGE UP (ref 3.5–5.3)
PROT SERPL-MCNC: 6.8 G/DL — SIGNIFICANT CHANGE UP (ref 6–8.3)
RBC # BLD: 4.45 M/UL — SIGNIFICANT CHANGE UP (ref 3.8–5.2)
RBC # FLD: 12.7 % — SIGNIFICANT CHANGE UP (ref 10.3–14.5)
SODIUM SERPL-SCNC: 141 MMOL/L — SIGNIFICANT CHANGE UP (ref 135–145)
TROPONIN I SERPL-MCNC: 0 NG/ML — LOW (ref 0.02–0.06)
WBC # BLD: 6.3 K/UL — SIGNIFICANT CHANGE UP (ref 3.8–10.5)
WBC # FLD AUTO: 6.3 K/UL — SIGNIFICANT CHANGE UP (ref 3.8–10.5)

## 2021-09-29 PROCEDURE — 93005 ELECTROCARDIOGRAM TRACING: CPT

## 2021-09-29 PROCEDURE — 99285 EMERGENCY DEPT VISIT HI MDM: CPT

## 2021-09-29 PROCEDURE — 93010 ELECTROCARDIOGRAM REPORT: CPT

## 2021-09-29 PROCEDURE — 36415 COLL VENOUS BLD VENIPUNCTURE: CPT

## 2021-09-29 PROCEDURE — 85379 FIBRIN DEGRADATION QUANT: CPT

## 2021-09-29 PROCEDURE — 81025 URINE PREGNANCY TEST: CPT

## 2021-09-29 PROCEDURE — 80053 COMPREHEN METABOLIC PANEL: CPT

## 2021-09-29 PROCEDURE — 85025 COMPLETE CBC W/AUTO DIFF WBC: CPT

## 2021-09-29 PROCEDURE — 84484 ASSAY OF TROPONIN QUANT: CPT

## 2021-09-29 PROCEDURE — 99283 EMERGENCY DEPT VISIT LOW MDM: CPT | Mod: 25

## 2021-09-29 NOTE — ED PROVIDER NOTE - MDM PATIENT STATEMENT FOR ADDL TREATMENT
Who Is Your Referring Provider?: Maite Abdi Patient with one or more new problems requiring additional work-up/treatment.

## 2021-09-29 NOTE — ED PROVIDER NOTE - OBJECTIVE STATEMENT
Patient had sudden, sharp pain in left upper chest about 2.5 hours PTA. Lasted 2 seconds, but has had some dull discomfort there since with some left arm discomfort as well. Had a second milder, shorter sharp pain in same spot on the way here. No other symptoms. Patient has been having chest pains over the past months. Has seen a cardiologist and has had an EKG, echocardiogram, and exercise stress test, all normal. Patient was told all was well but as she still has pains, is now scheduled for a CT of the coronary arteries. Non-smoker, no HTN/DM/HLD/FH

## 2021-09-29 NOTE — ED ADULT NURSE REASSESSMENT NOTE - NS ED NURSE REASSESS COMMENT FT1
patient was discharged in stable condition, instructions were provided and reviewed, verbalized understanding. IV access was discontinued and vital signs updated. Left AAOx4 was ambulatory and was accompanied by father.
IV access was established and lab specimens sent to lab, patient ambulated to restroom with a steady gait and urine specimen was obtained. Patient remains on continuous cardiac monitoring, pending results and disposition, explanation of care was provided, nursing care ongoing.

## 2021-09-29 NOTE — ED PROVIDER NOTE - PATIENT PORTAL LINK FT
You can access the FollowMyHealth Patient Portal offered by [facility] by registering at the following website: [email or web address]. By joining [employer]’s FollowMyHealth portal, you will also be able to view your health information using other applications (apps) compatible with our system.

## 2021-09-29 NOTE — ED PROVIDER NOTE - CARDIAC, MLM
Normal rate, regular rhythm.  Heart sounds S1, S2.  No murmurs, rubs or gallops. Mild chest wall tenderness in area of pain in left upper chest Normal rate, regular rhythm.  Heart sounds S1, S2.  3/6 systolic murmur. Mild chest wall tenderness in area of pain in left upper chest

## 2021-09-29 NOTE — ED PROVIDER NOTE - PROGRESS NOTE DETAILS
Patient given option of staying for serial troponin. With atypical nature of pain, patient demographics, and recent normal stress test, patient comfortable with D/C, and f/u with her cardiologist

## 2021-09-29 NOTE — ED ADULT NURSE NOTE - OBJECTIVE STATEMENT
Patient presents to ED from home with complaint of left sided upper chest pain that occurred earlier today and lasted 2.5 seconds. Patient reports pain has resolved but still has some mild discomfort. Denies any shortness of breath, dizziness, nausea/vomiting/diarrhea. Skin is dry and normal for ethnicity, no JVD or peripheral edema is noted. Patient regularly follows up with cardiology.

## 2021-09-29 NOTE — ED PROVIDER NOTE - CLINICAL SUMMARY MEDICAL DECISION MAKING FREE TEXT BOX
Patient with atypical pain and recent normal cardiac workup. Has persisting concerns of CAD. Will get EKG/labs. If no significant findings, can f/u with her cardiologist

## 2021-10-01 RX ORDER — ESOMEPRAZOLE MAGNESIUM 40 MG/1
1 CAPSULE, DELAYED RELEASE ORAL
Qty: 0 | Refills: 0 | DISCHARGE

## 2021-10-01 RX ORDER — DOCUSATE SODIUM 100 MG
1 CAPSULE ORAL
Qty: 0 | Refills: 0 | DISCHARGE

## 2021-10-01 RX ORDER — LANSOPRAZOLE 15 MG/1
0 CAPSULE, DELAYED RELEASE ORAL
Qty: 0 | Refills: 0 | DISCHARGE

## 2021-11-12 ENCOUNTER — NON-APPOINTMENT (OUTPATIENT)
Age: 36
End: 2021-11-12

## 2021-11-23 ENCOUNTER — TRANSCRIPTION ENCOUNTER (OUTPATIENT)
Age: 36
End: 2021-11-23

## 2021-11-30 PROBLEM — N84.0 POLYP OF CORPUS UTERI: Chronic | Status: ACTIVE | Noted: 2019-05-29

## 2021-11-30 PROBLEM — K21.9 GASTRO-ESOPHAGEAL REFLUX DISEASE WITHOUT ESOPHAGITIS: Chronic | Status: ACTIVE | Noted: 2019-05-29

## 2021-11-30 PROBLEM — Q23.1 CONGENITAL INSUFFICIENCY OF AORTIC VALVE: Chronic | Status: ACTIVE | Noted: 2019-05-29

## 2021-12-23 ENCOUNTER — TRANSCRIPTION ENCOUNTER (OUTPATIENT)
Age: 36
End: 2021-12-23

## 2022-02-02 ENCOUNTER — OUTPATIENT (OUTPATIENT)
Dept: OUTPATIENT SERVICES | Facility: HOSPITAL | Age: 37
LOS: 1 days | End: 2022-02-02
Payer: COMMERCIAL

## 2022-02-02 ENCOUNTER — APPOINTMENT (OUTPATIENT)
Dept: CT IMAGING | Facility: CLINIC | Age: 37
End: 2022-02-02
Payer: COMMERCIAL

## 2022-02-02 DIAGNOSIS — Z98.890 OTHER SPECIFIED POSTPROCEDURAL STATES: Chronic | ICD-10-CM

## 2022-02-02 DIAGNOSIS — Z98.891 HISTORY OF UTERINE SCAR FROM PREVIOUS SURGERY: Chronic | ICD-10-CM

## 2022-02-02 DIAGNOSIS — R06.02 SHORTNESS OF BREATH: ICD-10-CM

## 2022-02-02 DIAGNOSIS — Q23.1 CONGENITAL INSUFFICIENCY OF AORTIC VALVE: ICD-10-CM

## 2022-02-02 DIAGNOSIS — I49.3 VENTRICULAR PREMATURE DEPOLARIZATION: ICD-10-CM

## 2022-02-02 PROCEDURE — 75574 CT ANGIO HRT W/3D IMAGE: CPT | Mod: 26

## 2022-02-02 PROCEDURE — 75574 CT ANGIO HRT W/3D IMAGE: CPT

## 2022-02-15 ENCOUNTER — NON-APPOINTMENT (OUTPATIENT)
Age: 37
End: 2022-02-15

## 2022-02-27 ENCOUNTER — TRANSCRIPTION ENCOUNTER (OUTPATIENT)
Age: 37
End: 2022-02-27

## 2022-03-08 ENCOUNTER — TRANSCRIPTION ENCOUNTER (OUTPATIENT)
Age: 37
End: 2022-03-08

## 2022-05-11 ENCOUNTER — NON-APPOINTMENT (OUTPATIENT)
Age: 37
End: 2022-05-11

## 2022-05-12 ENCOUNTER — APPOINTMENT (OUTPATIENT)
Dept: CARDIOLOGY | Facility: CLINIC | Age: 37
End: 2022-05-12

## 2022-05-12 NOTE — DISCUSSION/SUMMARY
[FreeTextEntry1] : \par Ms. Spence is a 36 yr old F with h/o bicuspid AV presents in to establish care in the [facility] program in setting of palpitations - s/p recent  2020 - with recent worsening palpitations\par \par During the last pregnnacy had 2 ER visits for possible ?TIAs/CVAs - negative work up. As per neurologist he thought it was pregnancy related migraines\par \par  She carries a past medical history of bicuspid aortic valve with mild AR, holter detected PVC, ACL repair, \par 2017- TTE showed bicuspid Aortic valve with moderate AR, no evidence of aortic coarctation. \par           Normal  in 2017 \par echo with bubbles\par cardiac monitor\par exercise stress test

## 2022-05-12 NOTE — HISTORY OF PRESENT ILLNESS
[FreeTextEntry1] : Ms. Spence is a 36 yr old F with h/o bicuspid AV presents in to establish care in the [facility] program in setting of palpitations - s/p recent  2020 - with recent worsening palpitations\par \par During the last pregnancy had 2 ER visits for possible ?TIAs/CVAs - negative work up. As per neurologist he thought it was pregnancy related migraines\par \par  She carries a past medical history of bicuspid aortic valve with mild AR, holter detected PVC, ACL repair, \par - TTE showed bicuspid Aortic valve with moderate AR, no evidence of aortic coarctation. \par           Normal  in 2017 \par \par \par Interval Note\par May 12, 2022\par \par Ms. 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

## 2022-06-16 ENCOUNTER — NON-APPOINTMENT (OUTPATIENT)
Age: 37
End: 2022-06-16

## 2022-06-18 ENCOUNTER — NON-APPOINTMENT (OUTPATIENT)
Age: 37
End: 2022-06-18

## 2022-06-27 ENCOUNTER — APPOINTMENT (OUTPATIENT)
Dept: CV DIAGNOSITCS | Facility: HOSPITAL | Age: 37
End: 2022-06-27

## 2022-06-27 ENCOUNTER — APPOINTMENT (OUTPATIENT)
Dept: CARDIOLOGY | Facility: CLINIC | Age: 37
End: 2022-06-27

## 2022-07-14 ENCOUNTER — OUTPATIENT (OUTPATIENT)
Dept: OUTPATIENT SERVICES | Facility: HOSPITAL | Age: 37
LOS: 1 days | End: 2022-07-14
Payer: COMMERCIAL

## 2022-07-14 ENCOUNTER — APPOINTMENT (OUTPATIENT)
Dept: CARDIOLOGY | Facility: CLINIC | Age: 37
End: 2022-07-14

## 2022-07-14 ENCOUNTER — APPOINTMENT (OUTPATIENT)
Dept: CV DIAGNOSITCS | Facility: HOSPITAL | Age: 37
End: 2022-07-14

## 2022-07-14 ENCOUNTER — NON-APPOINTMENT (OUTPATIENT)
Age: 37
End: 2022-07-14

## 2022-07-14 VITALS
HEART RATE: 68 BPM | BODY MASS INDEX: 24.59 KG/M2 | DIASTOLIC BLOOD PRESSURE: 74 MMHG | OXYGEN SATURATION: 100 % | HEIGHT: 64 IN | WEIGHT: 144 LBS | SYSTOLIC BLOOD PRESSURE: 111 MMHG

## 2022-07-14 DIAGNOSIS — Z98.890 OTHER SPECIFIED POSTPROCEDURAL STATES: Chronic | ICD-10-CM

## 2022-07-14 DIAGNOSIS — Z98.891 HISTORY OF UTERINE SCAR FROM PREVIOUS SURGERY: Chronic | ICD-10-CM

## 2022-07-14 DIAGNOSIS — R06.02 SHORTNESS OF BREATH: ICD-10-CM

## 2022-07-14 PROCEDURE — 99214 OFFICE O/P EST MOD 30 MIN: CPT

## 2022-07-14 PROCEDURE — 93306 TTE W/DOPPLER COMPLETE: CPT

## 2022-07-14 PROCEDURE — 93306 TTE W/DOPPLER COMPLETE: CPT | Mod: 26

## 2022-07-14 PROCEDURE — 93356 MYOCRD STRAIN IMG SPCKL TRCK: CPT

## 2022-07-14 PROCEDURE — 93000 ELECTROCARDIOGRAM COMPLETE: CPT

## 2022-07-19 DIAGNOSIS — Z00.00 ENCOUNTER FOR GENERAL ADULT MEDICAL EXAMINATION W/OUT ABNORMAL FINDINGS: ICD-10-CM

## 2022-07-25 ENCOUNTER — LABORATORY RESULT (OUTPATIENT)
Age: 37
End: 2022-07-25

## 2022-07-25 ENCOUNTER — OUTPATIENT (OUTPATIENT)
Dept: OUTPATIENT SERVICES | Facility: HOSPITAL | Age: 37
LOS: 1 days | End: 2022-07-25
Payer: COMMERCIAL

## 2022-07-25 ENCOUNTER — APPOINTMENT (OUTPATIENT)
Dept: CV DIAGNOSITCS | Facility: HOSPITAL | Age: 37
End: 2022-07-25

## 2022-07-25 VITALS
HEART RATE: 82 BPM | DIASTOLIC BLOOD PRESSURE: 76 MMHG | OXYGEN SATURATION: 99 % | TEMPERATURE: 99 F | RESPIRATION RATE: 16 BRPM | HEIGHT: 64 IN | SYSTOLIC BLOOD PRESSURE: 114 MMHG | WEIGHT: 145.95 LBS

## 2022-07-25 VITALS
OXYGEN SATURATION: 96 % | SYSTOLIC BLOOD PRESSURE: 101 MMHG | HEART RATE: 66 BPM | RESPIRATION RATE: 15 BRPM | DIASTOLIC BLOOD PRESSURE: 65 MMHG

## 2022-07-25 DIAGNOSIS — Q23.1 CONGENITAL INSUFFICIENCY OF AORTIC VALVE: ICD-10-CM

## 2022-07-25 DIAGNOSIS — Z98.890 OTHER SPECIFIED POSTPROCEDURAL STATES: Chronic | ICD-10-CM

## 2022-07-25 DIAGNOSIS — Z98.891 HISTORY OF UTERINE SCAR FROM PREVIOUS SURGERY: Chronic | ICD-10-CM

## 2022-07-25 PROCEDURE — 93325 DOPPLER ECHO COLOR FLOW MAPG: CPT

## 2022-07-25 PROCEDURE — 93320 DOPPLER ECHO COMPLETE: CPT

## 2022-07-25 PROCEDURE — 93325 DOPPLER ECHO COLOR FLOW MAPG: CPT | Mod: 26

## 2022-07-25 PROCEDURE — 93312 ECHO TRANSESOPHAGEAL: CPT

## 2022-07-25 PROCEDURE — 93320 DOPPLER ECHO COMPLETE: CPT | Mod: 26

## 2022-07-25 PROCEDURE — 93312 ECHO TRANSESOPHAGEAL: CPT | Mod: 26

## 2022-10-11 NOTE — HISTORY OF PRESENT ILLNESS
[FreeTextEntry1] : Ms. Spence is a 37 yr old F with h/o bicuspid AV presents in to follow up in the [facility] program in setting of palpitations - s/p recent  2020 - with recent worsening palpitations\par \par During the last pregnancy had 2 ER visits for possible ?TIAs/CVAs - negative work up. As per neurologist he thought it was pregnancy related migraines\par \par  She carries a past medical history of bicuspid aortic valve with mild AR, holter detected PVC, ACL repair, \par - TTE showed bicuspid Aortic valve with moderate AR, no evidence of aortic coarctation. \par           Normal  in 2017 \par \par \par Interval Note\par 2022\par \par Ms. 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 Spence reports feeling well. Remains active.\par .\par \par Of note, patient underwent her annual echocardiogram today. \par Preliminary reading: slight progression of aortic regurgitation. \par \par \par \par

## 2022-10-11 NOTE — DISCUSSION/SUMMARY
[EKG obtained to assist in diagnosis and management of assessed problem(s)] : EKG obtained to assist in diagnosis and management of assessed problem(s) [FreeTextEntry1] : \par Ms. 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 Encouraged the patient to monitor blood pressure at home, keep a log, and report results back to us for evaluation. Based on results, we will adjust the regimen as necessary.\par - ECG with no acute ischemic changes (stable from prior)\par - Encouraged the patient to find healthy outlets and coping mechanisms to help manage stress, such as physical activity/exercise, reducing workload if possible, spending time with family and friends, engaging in an enjoyable hobby, or using meditation or mindfulness techniques.\par - Encouraged patient to continue healthy exercise and eating habits, focusing on a Mediterranean style of eating and aiming for the recommended 150 minutes per week of moderate physical activity.\par \par \par \par \par \par \par \par

## 2022-10-11 NOTE — ADDENDUM
[FreeTextEntry1] : 10/11/22:\par At the current time, patient is at acceptable risk for the IV vitamin infusion. Patient is asymptomatic with no evidence of acute decompensated heart failure, unstable angina or arrhythmias. No further cardiac work up needed at this time.\par

## 2022-12-01 ENCOUNTER — NON-APPOINTMENT (OUTPATIENT)
Age: 37
End: 2022-12-01

## 2023-01-05 ENCOUNTER — NON-APPOINTMENT (OUTPATIENT)
Age: 38
End: 2023-01-05

## 2023-02-26 ENCOUNTER — NON-APPOINTMENT (OUTPATIENT)
Age: 38
End: 2023-02-26

## 2023-03-27 ENCOUNTER — NON-APPOINTMENT (OUTPATIENT)
Age: 38
End: 2023-03-27

## 2023-05-07 ENCOUNTER — NON-APPOINTMENT (OUTPATIENT)
Age: 38
End: 2023-05-07

## 2023-06-10 ENCOUNTER — EMERGENCY (EMERGENCY)
Facility: HOSPITAL | Age: 38
LOS: 0 days | Discharge: HOME | End: 2023-06-10
Attending: EMERGENCY MEDICINE
Payer: COMMERCIAL

## 2023-06-10 VITALS
WEIGHT: 149.91 LBS | TEMPERATURE: 99 F | OXYGEN SATURATION: 99 % | HEIGHT: 64 IN | SYSTOLIC BLOOD PRESSURE: 105 MMHG | DIASTOLIC BLOOD PRESSURE: 75 MMHG | RESPIRATION RATE: 16 BRPM | HEART RATE: 70 BPM
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
## 2023-06-10 DIAGNOSIS — Z98.890 OTHER SPECIFIED POSTPROCEDURAL STATES: Chronic | ICD-10-CM

## 2023-06-10 DIAGNOSIS — Z98.891 HISTORY OF UTERINE SCAR FROM PREVIOUS SURGERY: Chronic | ICD-10-CM

## 2023-06-10 DIAGNOSIS — Z88.1 ALLERGY STATUS TO OTHER ANTIBIOTIC AGENTS STATUS: ICD-10-CM
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
## 2023-06-10 DIAGNOSIS — K21.9 GASTRO-ESOPHAGEAL REFLUX DISEASE WITHOUT ESOPHAGITIS: ICD-10-CM

## 2023-06-10 DIAGNOSIS — Z87.59 PERSONAL HISTORY OF OTHER COMPLICATIONS OF PREGNANCY, CHILDBIRTH AND THE PUERPERIUM: ICD-10-CM

## 2023-06-10 DIAGNOSIS — S93.401A SPRAIN OF UNSPECIFIED LIGAMENT OF RIGHT ANKLE, INITIAL ENCOUNTER: ICD-10-CM
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
## 2023-06-10 PROCEDURE — 99284 EMERGENCY DEPT VISIT MOD MDM: CPT | Mod: 25

## 2023-06-10 PROCEDURE — 73610 X-RAY EXAM OF ANKLE: CPT | Mod: RT

## 2023-06-10 PROCEDURE — 99283 EMERGENCY DEPT VISIT LOW MDM: CPT | Mod: 25

## 2023-06-10 PROCEDURE — 29515 APPLICATION SHORT LEG SPLINT: CPT

## 2023-06-10 PROCEDURE — 73610 X-RAY EXAM OF ANKLE: CPT | Mod: 26,RT

## 2023-06-10 RX ORDER — IBUPROFEN 200 MG
1 TABLET ORAL
Qty: 20 | Refills: 0
Start: 2023-06-10

## 2023-06-10 NOTE — ED PROVIDER NOTE - OBJECTIVE STATEMENT
37 y/o female w/ a PMHx of GERD and uterine polyp presents to the ED s/p right ankle injury that occurred yesterday while playing tennis. Pt reports she twisted her ankle while playing +inversion injury. Pt states she had mild discomfort yesterday however this am pain started worsening, aggregated by weight bearing and movement. Pain is dull, mild at rest, however sharp during ROM or movement. Pt took 600mg Motrin this am. Pt denies right foot pain, numbness, or tingling. Pt denies any other injury or complaints. Allergies: Bacitracin. PCP: Dr. Winston. 39 y/o female w/ a PMHx of GERD and uterine polyp, BIB pvt car to the ED c/o'ing right ankle injury that occurred yesterday while playing tennis. Pt reports she twisted her ankle while playing, +inversion injury. Pt states she had mild discomfort yesterday however this AM pain much worse, aggravated by weight bearing and movement. Pain is dull, mild at rest, however sharp & more intense during AROM or movement. Pt took 600mg Motrin ~ 8 AM. Pt denies right foot pain, numbness, nor tingling. Pt denies any other injury or complaints.   Allergies: Bacitracin.     PCP: Dr. Winston.

## 2023-06-10 NOTE — ED ADULT NURSE NOTE - NSFALLUNIVINTERV_ED_ALL_ED
Bed/Stretcher in lowest position, wheels locked, appropriate side rails in place/Call bell, personal items and telephone in reach/Instruct patient to call for assistance before getting out of bed/chair/stretcher/Non-slip footwear applied when patient is off stretcher/[place] to call system/Physically safe environment - no spills, clutter or unnecessary equipment/Purposeful proactive rounding/Room/bathroom lighting operational, light cord in reach

## 2023-06-10 NOTE — ED PROVIDER NOTE - MUSCULOSKELETAL, MLM
Right ankle +mild soft tissue swelling overlying lateral malleolus w/ +TTP, decreased ROM due to pain, joint stable, mild ecchymotic discoloration. Right foot nontender, no swelling, DP pulse intact, normal motor/sensroy normal. Right ankle +mild soft tissue swelling overlying lateral malleolus w/ +TTP, decreased AROM due to pain, joint stable, + associated mild ecchymotic discoloration. Right foot nontender, no swelling, DP pulse intact, normal motor/sensory normal, normal CR.

## 2023-06-10 NOTE — ED PROVIDER NOTE - NSFOLLOWUPINSTRUCTIONS_ED_ALL_ED_FT
Wear Right ankle velcro splint as often as possible.  Topical ice pack right ankle today.  Elevate right foot as often as possible, including when in bed at night.  Ibuprofen 600 mg 1 tab every 6 - 8 hours as needed for pain.  Follow up Monday, 6/12, with orthopedist as you already pre-scheduled.      Ankle Sprain  Illustration of an ankle sprain caused by a foot turning outward and a foot turning inward.   An ankle sprain is a stretch or tear in a ligament in the ankle. Ligaments are tissues that connect bones to each other.    The two most common types of ankle sprains are:  Inversion sprain. This happens when the foot turns inward and the ankle rolls outward. It affects the ligament on the outside of the foot (lateral ligament).  Eversion sprain. This happens when the foot turns outward and the ankle rolls inward. It affects the ligament on the inner side of the foot (medial ligament).  What are the causes?  This condition is often caused by accidentally rolling or twisting the ankle.    What increases the risk?  You are more likely to develop this condition if you play sports.    What are the signs or symptoms?  Comparison of a normal ankle and an ankle that is swollen and bruised.  Symptoms of this condition include:  Pain in your ankle.  Swelling.  Bruising. This may develop right after you sprain your ankle or 1–2 days later.  Trouble standing or walking, especially when you turn or change directions.  How is this diagnosed?  This condition is diagnosed with:  A physical exam. During the exam, your health care provider will press on certain parts of your foot and ankle and try to move them in certain ways.  X-ray imaging. These may be taken to see how severe the sprain is and to check for broken bones.  How is this treated?  This condition may be treated with:  A brace or splint. This is used to keep the ankle from moving until it heals.  An elastic bandage. This is used to support the ankle.  Crutches.  Pain medicine.  Surgery. This may be needed if the sprain is severe.  Physical therapy. This may help to improve the range of motion in the ankle.  Follow these instructions at home:  If you have a brace or a splint:    Wear the brace or splint as told by your health care provider. Remove it only as told by your health care provider.  Loosen the brace or splint if your toes tingle, become numb, or turn cold and blue.  Keep the brace or splint clean.  If the brace or splint is not waterproof:  Do not let it get wet.  Cover it with a watertight covering when you take a bath or a shower.  If you have an elastic bandage (dressing):    Remove it to shower or bathe.  Try not to move your ankle much, but wiggle your toes from time to time. This helps to prevent swelling.  Adjust the dressing to make it more comfortable if it feels too tight.  Loosen the dressing if you have numbness or tingling in your foot, or if your foot becomes cold and blue.  Managing pain, stiffness, and swelling    A bag of ice on a towel on the skin.  Take over-the-counter and prescription medicines only as told by your health care provider.  For 2–3 days, keep your ankle raised (elevated) above the level of your heart as much as possible.  If directed, put ice on the injured area:  If you have a removable brace or splint, remove it as told by your health care provider.  Put ice in a plastic bag.  Place a towel between your skin and the bag.  Leave the ice on for 20 minutes, 2–3 times a day.  General instructions    Rest your ankle.  Do not use the injured limb to support your body weight until your health care provider says that you can. Use crutches as told by your health care provider.  Do not use any products that contain nicotine or tobacco, such as cigarettes, e-cigarettes, and chewing tobacco. If you need help quitting, ask your health care provider.  Keep all follow-up visits as told by your health care provider. This is important.  Contact a health care provider if:  You have rapidly increasing bruising or swelling.  Your pain is not relieved with medicine.  Get help right away if:  Your foot or toes become numb or blue.  You have severe pain that gets worse.  Summary  An ankle sprain is a stretch or tear in a ligament in the ankle. Ligaments are tissues that connect bones to each other.  This condition is often caused by accidentally rolling or twisting the ankle.  Symptoms include pain, swelling, bruising, and trouble walking.  To relieve pain and swelling, put ice on the affected ankle, raise your ankle above the level of your heart, and use an elastic bandage.  Keep all follow-up visits as told by your health care provider. This is important.  This information is not intended to replace advice given to you by your health care provider. Make sure you discuss any questions you have with your health care provider.        How to Use a Stirrup Ankle Brace  A technician helping a person put on a stirrup ankle brace.  A stirrup ankle brace is used to keep your ankle from moving after it gets hurt. Wearing this brace helps the ankle heal.    An ankle brace is made of stiff material that fits under the arch of the foot and goes up the sides of the leg. The liner of the brace may be made of a soft gel-like material, or it may be filled with air. The liner lets the brace fit the ankle well without causing pressure on the ankle bones.    How to put on a stirrup ankle brace  Put the bottom of your foot in the bottom of the brace (stirrup).  Pull the brace up against the sides of your ankle.  Fasten the straps on the brace or use an elastic wrap to keep the brace in place.  Adjust the straps or elastic wrap to make the brace fit close against your ankle. The brace should not be loose or too tight.  If the brace feels too tight or too loose, you should be able to easily adjust it to make it more comfortable.  Let your doctor know if you cannot make it more comfortable.  How to wear a stirrup ankle brace  Wear the brace as told by your doctor. Take it off only as told by your doctor.  Check the skin around the brace every day. Tell your doctor if you see problems.  Loosen the brace if your toes:  Tingle.  Become numb.  Turn cold and blue.  Try not to move your ankle too much. Wiggle your toes from time to time. This helps to prevent swelling.  Keep your brace clean and dry.  Do not let it get wet.  Take off the brace when you take a bath or shower.  If your brace gets dirty, clean it by following the product maker's instructions.  Do not wear the brace if it causes pain or swelling.  Follow these instructions at home:  Return to your normal activities when your doctor says that it is safe.  Keep all follow-up visits.  Contact a health care provider if:  You have more bruising, swelling, or pain.  Your brace causes pain or swelling.  You cannot make your brace more comfortable.  Get help right away if:  Your toes become blue or cold, and this does not get better when you loosen the brace.  Summary  A stirrup ankle brace is used to keep your ankle from moving (keep it steady).  A stirrup ankle brace can be worn comfortably inside most athletic shoes.  Wear the brace as told by your doctor. Take it off only as told by your doctor.  This information is not intended to replace advice given to you by your health care provider. Make sure you discuss any questions you have with your health care provider.

## 2023-06-10 NOTE — ED PROVIDER NOTE - CONSTITUTIONAL, MLM
normal... Well appearing, awake, alert, oriented to person, place, time/situation and in no apparent distress. Well appearing WF, awake, alert, oriented to person, place, time/situation and in no apparent distress.

## 2023-06-10 NOTE — ED ADULT TRIAGE NOTE - CHIEF COMPLAINT QUOTE
Pt presented to the ER with c/o right ankle injury. Pt stated that she was playing tennis yesterday when she rolled her ankle. Pt was able to walk on it yesterday but is unable to today.

## 2023-06-10 NOTE — ED ADULT NURSE NOTE - OBJECTIVE STATEMENT
Pt presents to ED c/o ankle pain. pt states she rolled ankle while playing tennis yesterday. pt states she continued to play tennis with injured foot. Pt states this morning ankle appeared swollen and increased pain

## 2023-06-10 NOTE — ED PROVIDER NOTE - PATIENT PORTAL LINK FT
You can access the FollowMyHealth Patient Portal offered by Health system by registering at the following website: [email or web address]. By joining [facility]’s FollowMyHealth portal, you will also be able to view your health information using other applications (apps) compatible with our system.

## 2023-06-10 NOTE — ED ADULT NURSE NOTE - ALCOHOL PRE SCREEN (AUDIT - C)
From: Maura Barry  To: Bronwyn Bird MD  Sent: 4/28/2020 12:38 PM EDT  Subject: Non-Urgent Medical Question    Hi Dr. MARTINEZ, I have recently seen my neurologist, Dr. Sabine Cuevas. She suggested that I up my trazodone dose to 100 mg per night. I have found that is too much and that 75 mg is just right. Since I have increased my dose I have run out of the trazodone before the insurance company will allow me to refill it. Would you please send a new prescription for 50 mg taking two at night. That way I can cut some of them in half to make 75 mg per night. My pharmacy is [facility] on the corner of [address] and [facility]. Thank you.  Portia Barry  
Statement Selected

## 2023-06-10 NOTE — ED PROCEDURE NOTE - NS ED PROCEDURE ASSISTED BY
Supervision was available/The procedure was performed independently The procedure was performed independently

## 2023-06-10 NOTE — ED PROVIDER NOTE - CLINICAL SUMMARY MEDICAL DECISION MAKING FREE TEXT BOX
+right ankle inversion injury occurred during tennis yesterday, pain manageable and self ambulatory yesterday but more severe and difficulty weight berating since this am, no distal weakness/numbness, Motrin 600 PO today. Plan for XR right ankle, observe, and reassess. +right ankle inversion injury occurred during tennis yesterday, pain manageable and self ambulatory yesterday but more severe and difficulty weight berating since this am, no distal weakness/numbness, Motrin 600 PO 8 AM, no indication for further dosing.  Plan for XR right ankle, observe, and reassess.    10:30, C MD Dawson:  XRs wet read negative.  Velcro R ankle splint applied (see Proc. Note).  ED RN to assess for whether crutches indicated prior to formal DC.  Pt for DC home, po Motrin, has Ortho appt. in 2 dd. +right ankle inversion injury occurred during tennis yesterday, pain manageable and self-ambulatory yesterday but more severe today w/ difficulty weight bearing since this AM; no distal weakness/numbness. Motrin 600 PO at 8 AM, no indication for further dosing.  Plan for XR right ankle, observe, and reassess.    10:30, C MD Dawson:  XRs wet read negative.  Velcro R ankle splint applied (see Proc. Note).  ED RN to assess for whether crutches indicated prior to formal DC.  Pt for DC home, po Motrin, has Ortho appt. in 2 dd.

## 2023-10-02 ENCOUNTER — APPOINTMENT (OUTPATIENT)
Dept: PAIN MANAGEMENT | Facility: CLINIC | Age: 38
End: 2023-10-02

## 2023-10-02 ENCOUNTER — APPOINTMENT (OUTPATIENT)
Dept: ORTHOPEDIC SURGERY | Facility: CLINIC | Age: 38
End: 2023-10-02
Payer: COMMERCIAL

## 2023-10-02 VITALS — HEIGHT: 64 IN | WEIGHT: 145 LBS | BODY MASS INDEX: 24.75 KG/M2

## 2023-10-02 DIAGNOSIS — Z78.9 OTHER SPECIFIED HEALTH STATUS: ICD-10-CM

## 2023-10-02 DIAGNOSIS — S39.012A STRAIN OF MUSCLE, FASCIA AND TENDON OF LOWER BACK, INITIAL ENCOUNTER: ICD-10-CM

## 2023-10-02 PROCEDURE — 72170 X-RAY EXAM OF PELVIS: CPT

## 2023-10-02 PROCEDURE — 99213 OFFICE O/P EST LOW 20 MIN: CPT

## 2023-10-02 PROCEDURE — 72100 X-RAY EXAM L-S SPINE 2/3 VWS: CPT

## 2023-10-02 RX ORDER — SERTRALINE 25 MG/1
25 TABLET, FILM COATED ORAL
Refills: 0 | Status: ACTIVE | COMMUNITY

## 2024-02-06 ENCOUNTER — APPOINTMENT (OUTPATIENT)
Dept: ORTHOPEDIC SURGERY | Facility: CLINIC | Age: 39
End: 2024-02-06
Payer: COMMERCIAL

## 2024-02-06 ENCOUNTER — APPOINTMENT (OUTPATIENT)
Dept: MRI IMAGING | Facility: CLINIC | Age: 39
End: 2024-02-06
Payer: COMMERCIAL

## 2024-02-06 VITALS — WEIGHT: 145 LBS | HEIGHT: 64 IN | BODY MASS INDEX: 24.75 KG/M2

## 2024-02-06 PROCEDURE — 99214 OFFICE O/P EST MOD 30 MIN: CPT

## 2024-02-06 PROCEDURE — 73721 MRI JNT OF LWR EXTRE W/O DYE: CPT | Mod: RT

## 2024-02-06 PROCEDURE — 73610 X-RAY EXAM OF ANKLE: CPT | Mod: RT

## 2024-02-06 NOTE — ASSESSMENT
[FreeTextEntry1] : ongoing instability >6 months from injury despite bracing and home exercise program with likely lateral gutter loose body discussed options going forward including both PT and surgical repair recommend mri to eval for ligament tear given chronic ongoing instability and loose body ice/elevate nsaids prn further plan pending MRI

## 2024-02-06 NOTE — IMAGING
[Right] : right ankle [There are no fractures, subluxations or dislocations. No significant abnormalities are seen] : There are no fractures, subluxations or dislocations. No significant abnormalities are seen [FreeTextEntry9] : likely lateral gutter loose body

## 2024-02-06 NOTE — DATA REVIEWED
[Outside X-rays] : outside x-rays [Right] : of the right [Ankle] : ankle [I reviewed the films/CD and additionally noted] : I reviewed the films/CD and additionally noted [FreeTextEntry1] : no acute fx -- deniz (6/2023)

## 2024-02-06 NOTE — HISTORY OF PRESENT ILLNESS
[8] : 8 [2] : 2 [Dull/Aching] : dull/aching [Sharp] : sharp [Stabbing] : stabbing [de-identified] : 02/06/2024:   inversion injury 9 months ago playing tennis. went to ED and given stirrup. returned to tennis using support. having continued pain w/ activity and unable to wear heels. has been using brace. feels continued giving way and instability. has been doing home exercise program w/o improvement of symptoms. no prior ankle probs. denies dm/tob. self employed -- consulting [] : no [FreeTextEntry1] : right ankle [de-identified] : brace [de-identified] : [facility] [de-identified] : Xray

## 2024-02-12 ENCOUNTER — APPOINTMENT (OUTPATIENT)
Dept: ORTHOPEDIC SURGERY | Facility: CLINIC | Age: 39
End: 2024-02-12
Payer: COMMERCIAL

## 2024-02-12 DIAGNOSIS — M24.071 LOOSE BODY IN RIGHT ANKLE: ICD-10-CM

## 2024-02-12 DIAGNOSIS — M24.074 LOOSE BODY IN RIGHT ANKLE: ICD-10-CM

## 2024-02-12 PROCEDURE — 99442: CPT

## 2024-03-22 ENCOUNTER — APPOINTMENT (OUTPATIENT)
Dept: ORTHOPEDIC SURGERY | Facility: CLINIC | Age: 39
End: 2024-03-22
Payer: COMMERCIAL

## 2024-03-22 VITALS — HEIGHT: 64 IN | WEIGHT: 145 LBS | BODY MASS INDEX: 24.75 KG/M2

## 2024-03-22 DIAGNOSIS — M25.371 OTHER INSTABILITY, RIGHT ANKLE: ICD-10-CM

## 2024-03-22 PROCEDURE — 99214 OFFICE O/P EST MOD 30 MIN: CPT

## 2024-03-22 RX ORDER — FAMOTIDINE 10 MG
TABLET,CHEWABLE ORAL
Refills: 0 | Status: ACTIVE | COMMUNITY

## 2024-03-22 NOTE — HISTORY OF PRESENT ILLNESS
[5] : 5 [3] : 3 [Dull/Aching] : dull/aching [Sharp] : sharp [Shooting] : shooting [Nothing helps with pain getting better] : Nothing helps with pain getting better [Lying in bed] : lying in bed [de-identified] : 3/22/24: R ankle injury in 6/2023 after she rolled the ankle playing tennis. Seen at [facility] ED. Returned to Tennis using an ankle support. Feels the ankle is unstable, and she would roll it without the support. She saw Dr Reddy where an MRI was done and surgery was recommended. She has been doing a home exercise program and pilates. [] : no [FreeTextEntry1] : right ankle [FreeTextEntry5] : rolled ankle 9 months ago playing tennis. seen kiran - here for second opinion. treated at [facility] [de-identified] : heels [de-identified] : Maureen

## 2024-03-22 NOTE — PHYSICAL EXAM
[Right] : right foot and ankle [NL (40)] : plantar flexion 40 degrees [NL 30)] : inversion 30 degrees [NL (20)] : eversion 20 degrees [2+] : dorsalis pedis pulse: 2+ [] : no achilles tendon insertion tenderness

## 2024-03-22 NOTE — DATA REVIEWED
[MRI] : MRI [Right] : of the right [Ankle] : ankle [I independently reviewed and interpreted images and report] : I independently reviewed and interpreted images and report [I reviewed the films/CD and agree] : I reviewed the films/CD and agree [FreeTextEntry1] : 2/6/24: Thickening and remodeling of the ATFL, CFL and anterior tibiofibular ligament. Contusion to the tip of the fibula. Contusion of the lateral talar body extending to the base of the posterior lateral process of the calcaneous. Healed oblique fracture of the distal fibula metadiaphysis with minimal residual marrow edema. Peroneal tendinopathy and tenosynovitis. Post tib tendinopathy with tenosynovitis.

## 2024-03-22 NOTE — DISCUSSION/SUMMARY
[Surgical risks reviewed] : Surgical risks reviewed [de-identified] : The risks and benefits of surgery have been discussed. Risks include but are not limited to bleeding, infection, reaction to anesthesia, injury to blood vessels and nerves, malunion, nonunion, necessity of repeat surgery, chronic pain, loss of limb and death. The patient understands the risks and agrees with the surgical plan. Details of the procedure and postoperative protocol were discussed at length. All questions have been answered.  Disferchoed R ankle brostrum rodriguez and ankle arthroscopy in 9/2024. Would not recommend CSI unless there is pain Would recommend she continue HEP as well as attend PT.

## 2024-05-03 ENCOUNTER — APPOINTMENT (OUTPATIENT)
Dept: ORTHOPEDIC SURGERY | Facility: HOSPITAL | Age: 39
End: 2024-05-03

## 2024-05-13 ENCOUNTER — APPOINTMENT (OUTPATIENT)
Dept: ORTHOPEDIC SURGERY | Facility: CLINIC | Age: 39
End: 2024-05-13

## 2024-05-31 ENCOUNTER — APPOINTMENT (OUTPATIENT)
Dept: CARDIOLOGY | Facility: CLINIC | Age: 39
End: 2024-05-31
Payer: COMMERCIAL

## 2024-05-31 VITALS
DIASTOLIC BLOOD PRESSURE: 73 MMHG | BODY MASS INDEX: 24.75 KG/M2 | WEIGHT: 145 LBS | HEIGHT: 64 IN | SYSTOLIC BLOOD PRESSURE: 111 MMHG | OXYGEN SATURATION: 100 % | HEART RATE: 82 BPM

## 2024-05-31 DIAGNOSIS — Q23.1 CONGENITAL INSUFFICIENCY OF AORTIC VALVE: ICD-10-CM

## 2024-05-31 PROCEDURE — 99215 OFFICE O/P EST HI 40 MIN: CPT | Mod: 25

## 2024-05-31 PROCEDURE — 93000 ELECTROCARDIOGRAM COMPLETE: CPT

## 2024-05-31 RX ORDER — DICLOFENAC POTASSIUM 50 MG/1
50 TABLET, COATED ORAL TWICE DAILY
Qty: 14 | Refills: 1 | Status: DISCONTINUED | COMMUNITY
Start: 2023-10-02 | End: 2024-05-31

## 2024-05-31 RX ORDER — CYCLOBENZAPRINE HYDROCHLORIDE 10 MG/1
10 TABLET, FILM COATED ORAL
Qty: 10 | Refills: 1 | Status: DISCONTINUED | COMMUNITY
Start: 2023-10-02 | End: 2024-05-31

## 2024-05-31 NOTE — REVIEW OF SYSTEMS
[Palpitations] : palpitations [Negative] : Heme/Lymph [FreeTextEntry5] : intermittent palpitations, mild per patient

## 2024-05-31 NOTE — HISTORY OF PRESENT ILLNESS
[FreeTextEntry1] : Ms. Spence is a 39 yr old F with h/o bicuspid AV presents  for follow up, last seen . s/p   2020 - Initially seen due to palpitations.  TTE then revealed bicuspid AV during workup.  Pregnancy history: , during pregnancy evaluated for possible ?TIAs/CVAs - negative work up. As per neurologist he thought it was pregnancy related migraines. Past health history:  bicuspid aortic valve with mild AR, holter detected PVC, ACL repair,  Most recent echo: : DANI: LVEF 65% Normal mitral valve, minimal MR, Bicuspid AV, probable fusion RCC/ LCC, mild AS, ESSENCE 1.7cm2, mild R, normal size aortic root, arch and descending thoracic aorta, normal LA , normal PAP.   mild AS, Mild AR, probable fusion of the RCC/ LCC .  BP: normotensive  111/73, HR 82, BMI 25 EKG: no ischemic changes Overall feels well aside from baseline anxiety, rare palpitations, no SOB, no CP, no edema, no syncope. Occasional lightheaded after activity.  Plays tennis and active with pilates, Maintains HH/ Vegan diet . Hydrates well.  No Echo since .

## 2024-05-31 NOTE — DISCUSSION/SUMMARY
[EKG obtained to assist in diagnosis and management of assessed problem(s)] : EKG obtained to assist in diagnosis and management of assessed problem(s) [FreeTextEntry1] : In summary,  Ms. Spence is a 39 yr old F with known  bicuspid AV presents  for follow up, last seen . s/p  2020 - Initially seen due to palpitations.  TTE then revealed bicuspid AV during workup.  Pregnancy history: , during pregnancy evaluated for possible ?TIAs/CVAs - negative work up. As per neurologist he thought it was pregnancy related migraines. Past health history:  bicuspid aortic valve with mild AR, Holter detected PVC, ACL repair,  Most recent echo: : DANI: LVEF 65% Normal mitral valve, minimal MR, Bicuspid AV, probable fusion RCC/ LCC, mild AS, ESSENCE 1.7cm2, mild R, normal size aortic root, arch and descending thoracic aorta, normal LA , normal PAP.   mild AS, Mild AR, probable fusion of the RCC/ LCC .  BP: normotensive  111/73, HR 82, BMI 25 EKG: no ischemic changes Overall feels well aside from baseline anxiety, rare palpitations, no SOB, no CP, no edema, no syncope. Occasional lightheaded after activity.  Plays tennis and active with pilates, Maintains HH/ Vegan diet . Hydrates well.  No Echo since .    #Bicuspid Aortic valve -Follow up echocardiogram  - Encouraged patient to participate in  healthy walking and exercise  and eating habits, focusing on a Mediterranean style of eating and aiming for the recommended 150 minutes per week of moderate physical activity. - Encouraged the patient to find healthy outlets and coping mechanisms to help manage stress, such as physical activity/exercise, reducing workload if possible, spending time with family and friends, engaging in an enjoyable hobby, or using meditation or mindfulness techniques.  follow up 6 months, TTE ordered Labwork per PMD

## 2024-05-31 NOTE — PHYSICAL EXAM
[Normal S1, S2] : normal S1, S2 [Murmur] : murmur [Normal] : moves all extremities, no focal deficits, normal speech [Appears Anxious] : appears anxious [de-identified] : II/IV GARRY

## 2024-06-12 ENCOUNTER — APPOINTMENT (OUTPATIENT)
Dept: CARDIOLOGY | Facility: CLINIC | Age: 39
End: 2024-06-12
Payer: COMMERCIAL

## 2024-06-12 PROCEDURE — 93306 TTE W/DOPPLER COMPLETE: CPT

## 2024-08-10 NOTE — ED ADULT NURSE NOTE - BREATHING, MLM
[facility]  PICU ACCEPTANCE NOTE    ADMISSION DATE:  8/4/2024  DATE:  8/10/2024  CURRENT HOSPITAL DAY:  Hospital Day: 7       CHIEF COMPLAINT:  fever, diarrhea, chest pain, rash       INTERVAL HISTORY:    Amairani Wayne is a 15 year old female with no significant PMH who presented with recurrent fevers, abdominal pain and rash admitted initially to the PICU for sepsis due to bacterial endocarditis. The LETTY suggested right atrial mass (1cmX 0.9cm) adherent to atrial septum and another mass (1cm X 0.7cm) attached to the left atrial surface of the anterior mitral valve leaflet which may extend to the left ventricular side of the mitral leaflet, a moderate pericardial effusion, a PFO with left to right shunting and mild mitral regurgitation.     Key Event Summary:  8/8 - Left atrial, mitral valve vegetation, and mitral leaflet abscess removal with mitral annuloplasty     Course of PCICU stay  CV: Returned from OR on milrinone and dopamine infusions which were discontinued on POD 1. Currently on no cardiac medications.   Respiratory: Extubated in OR to low flow nasal cannula. Weaned to room air on 8/10. Chest tubes in place with moderate amount of drainage.   FEN/GI: Patient was started on diuresis post-operatively with Lasix 20 mg IV TID. Home feeds were reinitiated. Milk protein allergy noted.   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 Septic emboli to skin and brain noted. Karius lab resent 8/10. Continue vancomycin IV q12 with pharmacy to dose. Continue Ceftriaxone with CNS penetration dosing. Plan to place PICC line for long term antibiotic need. Antibiotic duration tentatively 4-6 weeks but to be determined by infectious disease team on consult. Patient will need home health set up.   Heme: Systemic anticoagulation with bivalirudin infusion for hypercoagulable state and embolic brain infarcts. Plan to transition to enteral aspirin once chest tubes removed.   Neuro:   On  noncontrast MRI brain on 8/6, she was noted to have infarcts in multiple vascular territories, which would be consistent with embolic strokes. Her neurologic examination prior to surgical intervention and post-operatively are normal. MRI brain w/wo and MRA head w/wo imaging on 8/07, while it did not assess up to the vertex of the brain, was negative for mycotic aneurysms.   Pain & Sedation: Patient was smoothly transitioned from intravenous to oral pain medications and pain was well controlled.  Social: Family at the bedside throughout the hospital stay and regularly involved in patient care.      PICU Course:  Prior to transfer, bivalrudin was held 2 hours before PICC line placement. Pain reported a 5/10.    OBJECTIVE:    VITAL SIGNS:     Vital Last Value 24 Hour Range   Temperature 97.3 °F (36.3 °C) (08/10/24 0800) Temp  Min: 97.2 °F (36.2 °C)  Max: 98.8 °F (37.1 °C)   Pulse 101 (08/10/24 0800) Pulse  Min: 94  Max: 109   Respiratory 15 (08/10/24 0800) Resp  Min: 14  Max: 28   Non-Invasive  Blood Pressure (!) 82/35 (08/08/24 1430) No data recorded   Pulse Oximetry 96 % (08/10/24 0800) SpO2  Min: 94 %  Max: 99 %     Vital Today Admitted   Weight 57.6 kg (126 lb 15.8 oz) (08/07/24 2000) Weight: 53 kg (116 lb 13.5 oz) (08/04/24 1335)   Height N/A Height: 5' 2.6\" (159 cm) (08/04/24 1335)   Body Mass Index N/A BMI (Calculated): 20.96 (08/04/24 1335)     INTAKE/OUTPUT:    Date 08/09/24 0700 - 08/10/24 0659 08/10/24 0700 - 08/11/24 0659   Shift 7529-4574 0576-8608 6425-5230 24 Hour Total 1520-6560 5857-8936 6751-5591 24 Hour Total   INTAKE   P.O.(mL/kg) 180(3.13) 240(4.17) 60(1.04) 480(8.33)       I.V.(mL/kg) 125.77(2.18) 121.44(2.11) 121.44(2.11) 368.65(6.4) 5.18(0.09)   5.18(0.09)   IV Piggyback(mL/kg) 250(4.34) 200(3.47)  450(7.81)       Shift Total(mL/kg) 555.77(9.65) 561.44(9.75) 181.44(3.15) 1298.65(22.55) 5.18(0.09)   5.18(0.09)   OUTPUT   Urine(mL/kg/hr) 265(0.58) 630(1.37)  895(0.65)       Chest Tube(mL/kg/hr)  64(0.14) 50(0.11) 120(0.26) 234(0.17) 30   30     Output (mL) (Chest Tube 24 #3  Mediastinal) 12 20 30 62 20   20     Output Y (mL) (Chest Tube Bifurcated 24 Left Mediastinal Right Mediastinal) 52 30 90 172 10   10   Shift Total(mL/kg) 329(5.71) 680(11.81) 120(2.08) 1129(19.6) 30(0.52)   30(0.52)   Weight (kg) 57.6 57.6 57.6 57.6 57.6 57.6 57.6 57.6       PHYSICAL EXAM:    Physical Exam  Constitutional:       Appearance: Normal appearance. She is normal weight.   HENT:      Head: Normocephalic.      Right Ear: External ear normal.      Left Ear: External ear normal.      Nose: Nose normal.      Mouth/Throat:      Mouth: Mucous membranes are moist.      Pharynx: Oropharynx is clear.   Eyes:      Conjunctiva/sclera: Conjunctivae normal.      Pupils: Pupils are equal, round, and reactive to light.   Cardiovascular:      Rate and Rhythm: Normal rate and regular rhythm.      Pulses: Normal pulses.      Heart sounds: Normal heart sounds.      Comments: Sternotomy C/D/I  2 mediastinal chest tubes with bloody output 10cc/hr  Pulmonary:      Effort: Pulmonary effort is normal.      Breath sounds: Normal breath sounds.   Abdominal:      General: Abdomen is flat. Bowel sounds are normal. There is no distension.      Palpations: Abdomen is soft.   Musculoskeletal:         General: Normal range of motion.   Skin:     General: Skin is warm.      Capillary Refill: Capillary refill takes less than 2 seconds.      Comments: L arm PIV, R radial arterial line,  L femoral central line   Neurological:      General: No focal deficit present.      Mental Status: She is alert and oriented to person, place, and time. Mental status is at baseline.   Psychiatric:         Mood and Affect: Mood normal.         Behavior: Behavior normal.         RESPIRATORY SUPPORT:  Oxygen Therapy  O2 Device: None/Room air  O2 Flow Rate (L/min): 1 L/min  FiO2 (%): 100 %  EtCO2 mmH mmHg  [unfilled]    LABORATORY DATA:    Recent Results  (from the past 24 hour(s))   Heparin Neutralizing PTT, ECMO    Collection Time: 08/09/24  3:57 PM   Result Value Ref Range    Heparin Neutralizing PTT, ECMO 45 (H) 22 - 32 sec   BLOOD GAS, ARTERIAL WITH COOXIMETRY - RESPIRATORY    Collection Time: 08/09/24  4:07 PM   Result Value Ref Range    BASE EXCESS / DEFICIT, ARTERIAL - RESPIRATORY 5 (H) -2 - 3 mmol/L    HCO3, ARTERIAL - RESPIRATORY 30 (H) 22 - 28 mmol/L    PCO2, ARTERIAL - RESPIRATORY 44 32 - 45 mm Hg    PH, ARTERIAL - RESPIRATORY 7.44 7.35 - 7.45 Units    PO2, ARTERIAL - RESPIRATORY 115 (H) 83 - 108 mm Hg    O2 SATURATION, ARTERIAL - RESPIRATORY 99 95 - 99 %    CONDITION - RESPIRATORY NC 1L     CARBOXYHEMOGLOBIN - RESPIRATORY 0.6 <2.1 %    METHEMOGLOBIN - RESPIRATORY 0.8 <=1.6 %    OXYHEMOGLOBIN, ARTERIAL - RESPIRATORY 97.4 94.0 - 98.0 %    SITE - RESPIRATORY Arterial Line     TEMPERATURE - RESPIRATORY 37.0 degrees C   LACTIC ACID, ARTERIAL - RESPIRATORY    Collection Time: 08/09/24  4:07 PM   Result Value Ref Range    LACTIC ACID, ARTERIAL - RESPIRATORY 2.1 (HH) <1.6 mmol/L   CALCIUM, IONIZED - RESPIRATORY    Collection Time: 08/09/24  4:07 PM   Result Value Ref Range    CALCIUM, IONIZED - RESPIRATORY 1.22 1.15 - 1.29 mmol/L   HEMOGLOBIN - RESPIRATORY    Collection Time: 08/09/24  4:07 PM   Result Value Ref Range    HEMOGLOBIN - RESPIRATORY 8.4 (L) 12.0 - 15.5 g/dL   POTASSIUM - RESPIRATORY    Collection Time: 08/09/24  4:07 PM   Result Value Ref Range    POTASSIUM - RESPIRATORY 3.9 3.4 - 5.1 mmol/L   SODIUM - RESPIRATORY    Collection Time: 08/09/24  4:07 PM   Result Value Ref Range    SODIUM - RESPIRATORY 131 (L) 135 - 145 mmol/L   Vancomycin, Trough    Collection Time: 08/09/24  6:37 PM   Result Value Ref Range    Vancomycin, Trough 14.6 10.0 - 20.0 mcg/mL   Heparin Neutralizing PTT, ECMO    Collection Time: 08/09/24 11:40 PM   Result Value Ref Range    Heparin Neutralizing PTT, ECMO 42 (H) 22 - 32 sec   BLOOD GAS, ARTERIAL WITH COOXIMETRY - RESPIRATORY     Collection Time: 08/09/24 11:47 PM   Result Value Ref Range    BASE EXCESS / DEFICIT, ARTERIAL - RESPIRATORY 5 (H) -2 - 3 mmol/L    HCO3, ARTERIAL - RESPIRATORY 29 (H) 22 - 28 mmol/L    PCO2, ARTERIAL - RESPIRATORY 43 32 - 45 mm Hg    PH, ARTERIAL - RESPIRATORY 7.44 7.35 - 7.45 Units    PO2, ARTERIAL - RESPIRATORY 96 83 - 108 mm Hg    O2 SATURATION, ARTERIAL - RESPIRATORY 98 95 - 99 %    CONDITION - RESPIRATORY ROOM AIR     CARBOXYHEMOGLOBIN - RESPIRATORY 0.7 <2.1 %    METHEMOGLOBIN - RESPIRATORY 0.6 <=1.6 %    OXYHEMOGLOBIN, ARTERIAL - RESPIRATORY 96.4 94.0 - 98.0 %    SITE - RESPIRATORY Arterial Line     TEMPERATURE - RESPIRATORY 37.0 degrees C   LACTIC ACID, ARTERIAL - RESPIRATORY    Collection Time: 08/09/24 11:47 PM   Result Value Ref Range    LACTIC ACID, ARTERIAL - RESPIRATORY 1.7 (H) <1.6 mmol/L   CALCIUM, IONIZED - RESPIRATORY    Collection Time: 08/09/24 11:47 PM   Result Value Ref Range    CALCIUM, IONIZED - RESPIRATORY 1.21 1.15 - 1.29 mmol/L   HEMOGLOBIN - RESPIRATORY    Collection Time: 08/09/24 11:47 PM   Result Value Ref Range    HEMOGLOBIN - RESPIRATORY 8.0 (L) 12.0 - 15.5 g/dL   POTASSIUM - RESPIRATORY    Collection Time: 08/09/24 11:47 PM   Result Value Ref Range    POTASSIUM - RESPIRATORY 3.9 3.4 - 5.1 mmol/L   SODIUM - RESPIRATORY    Collection Time: 08/09/24 11:47 PM   Result Value Ref Range    SODIUM - RESPIRATORY 133 (L) 135 - 145 mmol/L   Magnesium    Collection Time: 08/10/24  5:31 AM   Result Value Ref Range    Magnesium 2.3 1.7 - 2.4 mg/dL   Phosphorus    Collection Time: 08/10/24  5:31 AM   Result Value Ref Range    Phosphorus 3.0 2.7 - 4.7 mg/dL   Comprehensive Metabolic Panel    Collection Time: 08/10/24  5:31 AM   Result Value Ref Range    Fasting Status      Sodium 137 135 - 145 mmol/L    Potassium 3.8 3.4 - 5.1 mmol/L    Chloride 104 97 - 110 mmol/L    Carbon Dioxide 29 21 - 32 mmol/L    Anion Gap 8 7 - 19 mmol/L    Glucose 139 (H) 70 - 99 mg/dL    BUN 16 6 - 20 mg/dL     Creatinine 0.54 0.39 - 0.90 mg/dL    Glomerular Filtration Rate      BUN/Cr 30 (H) 7 - 25    Calcium 8.0 8.0 - 11.0 mg/dL    Bilirubin, Total 0.1 (L) 0.2 - 1.0 mg/dL    GOT/AST 19 10 - 45 Units/L    GPT/ALT 15 6 - 35 Units/L    Alkaline Phosphatase 49 42 - 110 Units/L    Albumin 1.6 (L) 3.6 - 5.1 g/dL    Protein, Total 4.9 (L) 6.0 - 8.3 g/dL    Globulin 3.3 2.0 - 4.0 g/dL    A/G Ratio 0.5 (L) 1.0 - 2.4   CBC with Automated Differential (performable only)    Collection Time: 08/10/24  5:31 AM   Result Value Ref Range    WBC 15.7 (H) 4.2 - 11.0 K/mcL    RBC 2.60 (L) 3.90 - 5.30 mil/mcL    HGB 7.5 (L) 12.0 - 15.5 g/dL    HCT 21.8 (L) 36.0 - 46.5 %    MCV 83.8 78.0 - 100.0 fl    MCH 28.8 26.0 - 34.0 pg    MCHC 34.4 32.0 - 36.5 g/dL    RDW-CV 13.2 11.0 - 15.0 %    RDW-SD 40.4 39.0 - 50.0 fL     140 - 450 K/mcL    NRBC 0 <=0 /100 WBC    Neutrophil, Percent 84 %    Lymphocytes, Percent 8 %    Mono, Percent 7 %    Eosinophils, Percent 0 %    Basophils, Percent 0 %    Immature Granulocytes 1 %    Absolute Neutrophils 13.2 (H) 1.8 - 8.0 K/mcL    Absolute Lymphocytes 1.3 1.2 - 5.2 K/mcL    Absolute Monocytes 1.0 (H) 0.3 - 0.9 K/mcL    Absolute Eosinophils  0.0 0.0 - 0.5 K/mcL    Absolute Basophils 0.0 0.0 - 0.3 K/mcL    Absolute Immature Granulocytes 0.2 0.0 - 0.2 K/mcL   BLOOD GAS, ARTERIAL WITH COOXIMETRY - RESPIRATORY    Collection Time: 08/10/24  5:38 AM   Result Value Ref Range    BASE EXCESS / DEFICIT, ARTERIAL - RESPIRATORY 9 (H) -2 - 3 mmol/L    HCO3, ARTERIAL - RESPIRATORY 34 (H) 22 - 28 mmol/L    PCO2, ARTERIAL - RESPIRATORY 44 32 - 45 mm Hg    PH, ARTERIAL - RESPIRATORY 7.49 (H) 7.35 - 7.45 Units    PO2, ARTERIAL - RESPIRATORY 99 83 - 108 mm Hg    O2 SATURATION, ARTERIAL - RESPIRATORY 99 95 - 99 %    CONDITION - RESPIRATORY ROOM AIR     CARBOXYHEMOGLOBIN - RESPIRATORY 1.3 <2.1 %    METHEMOGLOBIN - RESPIRATORY 0.2 <=1.6 %    OXYHEMOGLOBIN, ARTERIAL - RESPIRATORY 97.4 94.0 - 98.0 %    SITE - RESPIRATORY  Arterial Line     TEMPERATURE - RESPIRATORY 37.0 degrees C   LACTIC ACID, ARTERIAL - RESPIRATORY    Collection Time: 08/10/24  5:38 AM   Result Value Ref Range    LACTIC ACID, ARTERIAL - RESPIRATORY 1.7 (H) <1.6 mmol/L   CALCIUM, IONIZED - RESPIRATORY    Collection Time: 08/10/24  5:38 AM   Result Value Ref Range    CALCIUM, IONIZED - RESPIRATORY 1.25 1.15 - 1.29 mmol/L   HEMOGLOBIN - RESPIRATORY    Collection Time: 08/10/24  5:38 AM   Result Value Ref Range    HEMOGLOBIN - RESPIRATORY 8.0 (L) 12.0 - 15.5 g/dL   POTASSIUM - RESPIRATORY    Collection Time: 08/10/24  5:38 AM   Result Value Ref Range    POTASSIUM - RESPIRATORY 4.0 3.4 - 5.1 mmol/L   SODIUM - RESPIRATORY    Collection Time: 08/10/24  5:38 AM   Result Value Ref Range    SODIUM - RESPIRATORY 133 (L) 135 - 145 mmol/L   BLOOD GAS, ARTERIAL WITH COOXIMETRY - RESPIRATORY    Collection Time: 08/10/24  9:38 AM   Result Value Ref Range    BASE EXCESS / DEFICIT, ARTERIAL - RESPIRATORY 7 (H) -2 - 3 mmol/L    HCO3, ARTERIAL - RESPIRATORY 31 (H) 22 - 28 mmol/L    PCO2, ARTERIAL - RESPIRATORY 41 32 - 45 mm Hg    PH, ARTERIAL - RESPIRATORY 7.49 (H) 7.35 - 7.45 Units    PO2, ARTERIAL - RESPIRATORY 100 83 - 108 mm Hg    O2 SATURATION, ARTERIAL - RESPIRATORY 98 95 - 99 %    CONDITION - RESPIRATORY ROOM AIR     CARBOXYHEMOGLOBIN - RESPIRATORY 1.1 <2.1 %    METHEMOGLOBIN - RESPIRATORY 0.5 <=1.6 %    OXYHEMOGLOBIN, ARTERIAL - RESPIRATORY 96.6 94.0 - 98.0 %    SITE - RESPIRATORY Arterial Line     TEMPERATURE - RESPIRATORY 37.0 degrees C   LACTIC ACID, ARTERIAL - RESPIRATORY    Collection Time: 08/10/24  9:38 AM   Result Value Ref Range    LACTIC ACID, ARTERIAL - RESPIRATORY 1.4 <1.6 mmol/L   CALCIUM, IONIZED - RESPIRATORY    Collection Time: 08/10/24  9:38 AM   Result Value Ref Range    CALCIUM, IONIZED - RESPIRATORY 1.21 1.15 - 1.29 mmol/L   HEMOGLOBIN - RESPIRATORY    Collection Time: 08/10/24  9:38 AM   Result Value Ref Range    HEMOGLOBIN - RESPIRATORY 7.7 (L) 12.0 -  15.5 g/dL   POTASSIUM - RESPIRATORY    Collection Time: 08/10/24  9:38 AM   Result Value Ref Range    POTASSIUM - RESPIRATORY 4.2 3.4 - 5.1 mmol/L   SODIUM - RESPIRATORY    Collection Time: 08/10/24  9:38 AM   Result Value Ref Range    SODIUM - RESPIRATORY 134 (L) 135 - 145 mmol/L   ]       IMAGING STUDIES:    XR CHEST AP OR PA    Result Date: 8/10/2024  EXAM: XR CHEST AP OR PA CLINICAL INDICATION: Evaluate lung parenchyma COMPARISON: Previous day. LINES/TUBES: [Unchanged] FINDINGS: Lungs: Hypoinflated. Improving bibasal atelectasis. Pleura: No pleural effusion or pneumothorax. Heart/mediastinum: Stable Others: Intact sternotomy wires     Improving bibasal atelectasis Electronically Signed by: CARLOS ANDERSEN MD Signed on: 8/10/2024 7:40 AM Workstation ID: [id number]      Imaging studies reviewed.           MEDICATIONS:    Current Facility-Administered Medications   Medication Dose Route Frequency Provider Last Rate Last Admin    bivalirudin (ANGIOMAX) 250 mg in sodium chloride 0.9 % 50 mL total volume infusion syringe  0.3 mg/kg/hr (Dosing Weight) Intravenous Continuous Masoud Voss CNP 3.18 mL/hr at 08/10/24 0759 0.3 mg/kg/hr at 08/10/24 0759    heparin 2 unit/mL in NaCL 0.9% solution  1 mL/hr Intra-arterial Continuous Anoop Flowers MD 1 mL/hr at 08/10/24 0759 1 mL/hr at 08/10/24 0759    heparin 2 unit/mL in NaCL 0.9% solution  1 mL/hr Intravenous Continuous Phoebe Barnhart CNS 1 mL/hr at 08/10/24 0759 1 mL/hr at 08/10/24 0759     Current Facility-Administered Medications   Medication Dose Route Frequency Provider Last Rate Last Admin    furosemide (LASIX INJECT) injection 20 mg  20 mg Intravenous TID Masoud Voss CNP   20 mg at 08/10/24 0913    docusate sodium-sennosides (SENOKOT S) 50-8.6 MG 1 tablet  1 tablet Oral BID Masoud Voss CNP   1 tablet at 08/10/24 0903    vancomycin (VANCOCIN) 1,060 mg in sodium chloride 0.9 % 250 mL IVPB  20 mg/kg (Dosing Weight) Intravenous Q12H Anoop Flowers MD  125 mL/hr at 08/10/24 0921 1,060 mg at 08/10/24 0921    heparin (porcine) 10 UNIT/ML lock flush 20 Units  2 mL Intracatheter 2 times per day Masoud Voss CNP   20 Units at 08/09/24 0937    ketorolac (TORADOL) injection 15 mg  15 mg Intravenous Q6H Masoud Voss CNP   15 mg at 08/10/24 0539    famotidine (PEPCID) injection 20 mg  20 mg Intravenous 2 times per day Masoud Voss CNP   20 mg at 08/10/24 0906    polyethylene glycol (MIRALAX) packet 17 g  17 g Oral Daily Masoud Voss CNP   17 g at 08/10/24 0904    cefTRIAXone (ROCEPHIN) syringe 2,000 mg  2,000 mg Intravenous 2 times per day Anoop Flowers MD   2,000 mg at 08/10/24 1008    VANCOMYCIN - PHARMACIST MONITORED Misc   Does not apply See Admin Instructions Rosalia Gray DO           ACTIVE PROBLEMS:    Active Hospital Problems    Diagnosis     Endocarditis     Mitral valve vegetation (CMD)     Elevated procalcitonin     Hypoalbuminemia     Elevated C-reactive protein (CRP)     Thrombocytosis     Sepsis  (CMD)     Pericardial effusion (CMD)                         ASSESSMENT:     Amairani Wayne is a 15 year old female with no medical history admitted with fevers, diarrhea, and rash found to have 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 She is now s/p mitral leaflet abscess removal with mitral annuloplasty on 8/8. Initial post op course complicated by need for pressor support, but is now hemodynamically stable. She remains admitted for long term antibiotic therapy planning and continued post op monitoring. She requires PICU level of care for risk of cardiopulmonary decompensation.     PLAN:    Neuro  #Multiple embolic strokes  #Post op pain control  Neurology on consult, appreciate recommendations  - CT head without contrast to evaluate for hemorrhagic conversion of embolic strokes  Pain  - Tylenol 650mg q6 PRN  - Toradol 15mg q6h x5 days (s 8/8)  - Flexaril 5mg q8h PRN  - Norco 5-325 1 tab  q4h PRN  - Morphine 2mg q4h PRN    Cardiovascular  # Left atrial, mitral valve vegetation  # S/p mitral leaflet abscess removal with mitral annuloplasty  Cardiology on consult, appreciate recommendations  - no cardiac medications at this time  - monitor chest tube drain output and pull when decreased  - monitor hemoglobin with gases q12     Respiratory  Room air, ok for low flow O2 for comfort  ABG q12    FEN/GI  General diet  Bowel regimen:  - Miralax 17g daily  - Senokot 1 tab qD  Lasix 20mg TID  Pepcid 20mg q12 while on toradol    ID  #Culture negative bacterial endocarditis  ID on consult, appreciate recommendations  - Will likely need 4-6 weeks of treatment  - s/p cefepime (8/4-8/6)  - CTX 2g q12 (s 8/7-  - Vancomycin 20mg/kg q12h - pharmacy to dose (s 8/4-  - F/u karius testing (to be sent out on 8/11)  - PICC line placement today    Heme  - Bival 0.3mg/kg/hr (holding 2 hours prior to PICC placement)  - Will potentially transition to aspirin when chest tubes are removed.     Other:  PT/OT    VTE SCORE 2, recent surgery, PIC  SCDs yes  LINES: The use, function and necessity of all lines and invasive devices was discussed on rounds     CAP-D Score: 2  PICU UP LEVEL  Level 3: Level 1 and 2 activities plus OOB to chair TID or sitting up in bed TID if appropriate chair is not available; ambulate BID if trunk control present     Disposition  Will remain admitted post op monitoring and pain control    Alex Piña,   Pediatrics PGY-2  [facility] [place]   Spontaneous, unlabored and symmetrical

## 2024-09-20 ENCOUNTER — APPOINTMENT (OUTPATIENT)
Dept: ORTHOPEDIC SURGERY | Facility: CLINIC | Age: 39
End: 2024-09-20

## 2024-09-20 ENCOUNTER — TRANSCRIPTION ENCOUNTER (OUTPATIENT)
Age: 39
End: 2024-09-20

## 2024-09-20 ENCOUNTER — NON-APPOINTMENT (OUTPATIENT)
Age: 39
End: 2024-09-20

## 2024-09-20 DIAGNOSIS — M25.371 OTHER INSTABILITY, RIGHT ANKLE: ICD-10-CM

## 2024-09-20 DIAGNOSIS — M24.071 LOOSE BODY IN RIGHT ANKLE: ICD-10-CM

## 2024-09-20 DIAGNOSIS — M24.074 LOOSE BODY IN RIGHT ANKLE: ICD-10-CM

## 2024-09-20 PROCEDURE — 99204 OFFICE O/P NEW MOD 45 MIN: CPT | Mod: 25

## 2024-09-20 PROCEDURE — 20605 DRAIN/INJ JOINT/BURSA W/O US: CPT | Mod: RT

## 2024-09-20 NOTE — HISTORY OF PRESENT ILLNESS
[FreeTextEntry1] : 09/20/2024: BEBETO NIELSEN is a 39 year old female presenting to the office for an initial evaluation of right ankle pain. She initially saw Dr. Reddy and Dr. Crabtree for her ankle. She reports that the pain began in June 2023 when she rolled her ankle playing tennis. Initially, she noticed swelling, which improved. The next day, she went to [facility]. She received x-rays, which found no fractures. She states that she was wearing an air splint. She was attending physical therapy under Dr. Crabtree. She states that she can play tennis with an ankle brace without difficulty. She is able to sleep at night with an ankle sleeve. However, she reports pain when she wears high heels. She states that she wants to be able to wear high heels again. She is planning to have surgery (Right ankle [facility] and ankle arthroscopy), which will be performed by Dr. Crabtree in October. The patient presents to the office in sneakers and ambulating without assistance.

## 2024-09-20 NOTE — ADDENDUM
[FreeTextEntry1] : I, Sathya Lucio, acted solely as a scribe for Dr. Karel Paige on this date 09/20/2024.   All medical record entries made by the Scribe were at my, Dr. Krael Paige, direction and personally dictated by me on 09/20/2024. I have reviewed the chart and agree that the record accurately reflects my personal performance of the history, physical exam, assessment and plan. I have also personally directed, reviewed, and agreed with the chart.

## 2024-09-20 NOTE — PROCEDURE
[FreeTextEntry1] : Laterality: Right ankle Tibiotalar Joint Injection (cortisone)  The risks and benefits of the injection were reviewed with the patient today in office and all their questions were answered.  The injection site was the right ankle Tibiotalar Joint.  Prior to giving the injection the injection site was prepped with Chloraprep and a sterile field was created.  Sterile technique was carried out throughout the procedure.  After verbal consent from the patient we went ahead and injected the right ankle with 1 ml 40 mg Depo-Medrol, 3 ml 1% lidocaine without epinephrine for a total of 4 ml with a 25 regina 1.5" needle.  The medication was placed into the joint without complication.  Post injection the patient reported no pain, had a normal gait and good motion of the ankle.  The patient denied numbness and tingling in their foot.  There were no complications during the procedure.

## 2024-09-20 NOTE — PHYSICAL EXAM
[de-identified] : Right ankle Physical Examination:   General: Alert and oriented x3.  In no acute distress.  Pleasant in nature with a normal affect.  No apparent respiratory distress. Erythema, Warmth, Rubor: Negative Swelling: Negative  +lateral gutter   ROM: 1. Dorsiflexion: 10 degrees 2. Plantarflexion: 40 degrees 3. 10 degrees of subtalar motion 4. Inversion: 20 degrees 5. Eversion: 20 degrees   Tenderness to Palpation: 1. Lateral Malleolus: Negative 2. Medial Malleolus: Negative 3. Proximal Fibular Pain: Negative 4. Heel Pain: Negative 5. Cuboid: Negative 6. Navicular: Negative 7. Tibiotalar Joint: Negative 8. Subtalar Joint: Negative 9. Posterior Recess: Negative   Tendon Pain: 1. Achilles: Negative 2. Peroneals: Negative 3. Posterior Tibialis: Negative 4. Tibialis Anterior: Negative   Ligament Pain: 1. ATFL: Negative 2. CFL: Negative 3. PTFL: Negative 4. Deltoid Ligaments: Negative 5. Lis Franc Ligament: Negative   Stability: 1. Anterior Drawer: Negative 2. Posterior Drawer: Negative   Strength: 5/5 TA/GS/EHL   Pulses: 2+ DP/PT Pulses   Neuro: Intact motor and sensory   Additional Test: 1. Calcaneal Squeeze Test: Negative 2. Syndesmosis Squeeze Test: Negative 3. Maya Test: Negative  [de-identified] : MRI of the right ankle obtained from outside facility on 02/06/2024 and reviewed in the office today, 09/20/2024, revealed:  Impression: 1. Thickening and remodeling of the anterior talofibular ligament, calcaneofibular ligament, and anterior tibiofibular ligament compatible with chronic tears. 2. Contusion at the tip of the fibula. Contusion of the lateral talar body extending into the base of the posterior lateral process of the calcaneus. 3. Healed oblique fracture of the distal fibular metadiaphysis with minimal residual marrow edema. 4. Peroneal tendinopathy and tenosynovitis. 5. Posterior tibial tendinopathy with tenosynovitis.

## 2024-09-20 NOTE — DISCUSSION/SUMMARY
[de-identified] : Today I had a lengthy discussion with the patient regarding their right ankle pain. I have addressed all the patient's concerns surrounding the pathology of their condition. I have reviewed the patient's MRI results with them in great detail. Thickening and remodeling of the anterior talofibular ligament, calcaneofibular ligament, and anterior tibiofibular ligament compatible with chronic tears. Contusion at the tip of the fibula. Contusion of the lateral talar body extending into the base of the posterior lateral process of the calcaneus. Healed oblique fracture of the distal fibular metadiaphysis with minimal residual marrow edema.  Peroneal tendinopathy and tenosynovitis. The patient is planning to have surgery (Right ankle [place] and ankle arthroscopy). I warned the patient of the risks of surgery and the time for rehabilitation. Although I support Dr. Crabtree's decision for surgery, I advised the patient to consider a cortisone injection before she tries a more invasive procedure. The patient would like to move forward with the cortisone injection. I advised the patient to cancel her surgery, as she should not receive surgery until at least 3 months after the injection.   Plan:  1. I recommend that the patient utilize ice, NSAIDS/Tylenol PRN, and heat.  2. A discussion was had about a possible cortisone injection for the right ankle. The patient would like to move forward with the procedure. She denies any allergies to iodine or steroids. The injection was administered in the office today. The patient tolerated the procedure well with no resistance.  f/u prn   The patient understood and verbally agreed to the treatment plan. All of their questions were answered, and they were satisfied with the visit. The patient should call the office if they have any questions or experience worsening symptoms.

## 2024-10-08 ENCOUNTER — APPOINTMENT (OUTPATIENT)
Dept: ORTHOPEDIC SURGERY | Facility: CLINIC | Age: 39
End: 2024-10-08

## 2024-10-14 ENCOUNTER — APPOINTMENT (OUTPATIENT)
Age: 39
End: 2024-10-14

## 2024-10-25 ENCOUNTER — APPOINTMENT (OUTPATIENT)
Dept: ORTHOPEDIC SURGERY | Facility: CLINIC | Age: 39
End: 2024-10-25

## 2024-12-02 ENCOUNTER — NON-APPOINTMENT (OUTPATIENT)
Age: 39
End: 2024-12-02

## 2024-12-02 ENCOUNTER — APPOINTMENT (OUTPATIENT)
Dept: CARDIOLOGY | Facility: CLINIC | Age: 39
End: 2024-12-02
Payer: COMMERCIAL

## 2024-12-02 VITALS
BODY MASS INDEX: 23.05 KG/M2 | HEART RATE: 86 BPM | OXYGEN SATURATION: 100 % | HEIGHT: 64 IN | DIASTOLIC BLOOD PRESSURE: 83 MMHG | SYSTOLIC BLOOD PRESSURE: 122 MMHG | WEIGHT: 135 LBS

## 2024-12-02 PROCEDURE — 93000 ELECTROCARDIOGRAM COMPLETE: CPT

## 2024-12-02 PROCEDURE — G2211 COMPLEX E/M VISIT ADD ON: CPT | Mod: NC

## 2024-12-02 PROCEDURE — 99214 OFFICE O/P EST MOD 30 MIN: CPT

## 2025-01-15 NOTE — ED PROVIDER NOTE - TIMING
Requested Prescriptions     Pending Prescriptions Disp Refills    MESALAMINE  MG Oral Capsule Delayed Release [Pharmacy Med Name: MESALAMINE 400MG DR CAPSULES] 180 capsule 2     Sig: TAKE 2 CAPSULES(800 MG) BY MOUTH THREE TIMES DAILY BEFORE MEALS          LOV     10/20/2022      LR    10/4/2024      Last Colon 8/24/2023      Follow up appointment scheduled on -   3/19/2025 at 2pm    sudden onset

## 2025-02-28 ENCOUNTER — NON-APPOINTMENT (OUTPATIENT)
Age: 40
End: 2025-02-28

## 2025-04-04 NOTE — ED ADULT NURSE NOTE - NURSING NEURO LEVEL OF CONSCIOUSNESS
Upcoming appt 4/18 for UTI  Ldvm -stating due to symptoms of extreme burning sensation while urinating with frequency for past the 2-3 days -needs to be evaluated sooner and recommended to go to an urgent care.    alert and awake

## 2025-06-19 ENCOUNTER — RESULT REVIEW (OUTPATIENT)
Age: 40
End: 2025-06-19

## 2025-06-19 ENCOUNTER — OUTPATIENT (OUTPATIENT)
Dept: OUTPATIENT SERVICES | Facility: HOSPITAL | Age: 40
LOS: 1 days | End: 2025-06-19
Payer: COMMERCIAL

## 2025-06-19 ENCOUNTER — NON-APPOINTMENT (OUTPATIENT)
Age: 40
End: 2025-06-19

## 2025-06-19 ENCOUNTER — APPOINTMENT (OUTPATIENT)
Dept: CV DIAGNOSITCS | Facility: HOSPITAL | Age: 40
End: 2025-06-19

## 2025-06-19 ENCOUNTER — APPOINTMENT (OUTPATIENT)
Dept: CARDIOLOGY | Facility: CLINIC | Age: 40
End: 2025-06-19
Payer: COMMERCIAL

## 2025-06-19 VITALS
HEART RATE: 91 BPM | HEIGHT: 64 IN | BODY MASS INDEX: 23.05 KG/M2 | DIASTOLIC BLOOD PRESSURE: 82 MMHG | SYSTOLIC BLOOD PRESSURE: 121 MMHG | WEIGHT: 135 LBS | OXYGEN SATURATION: 97 %

## 2025-06-19 DIAGNOSIS — Z98.890 OTHER SPECIFIED POSTPROCEDURAL STATES: Chronic | ICD-10-CM

## 2025-06-19 DIAGNOSIS — Q23.1 CONGENITAL INSUFFICIENCY OF AORTIC VALVE: ICD-10-CM

## 2025-06-19 DIAGNOSIS — Z98.891 HISTORY OF UTERINE SCAR FROM PREVIOUS SURGERY: Chronic | ICD-10-CM

## 2025-06-19 PROCEDURE — 93306 TTE W/DOPPLER COMPLETE: CPT | Mod: 26

## 2025-06-19 PROCEDURE — 93306 TTE W/DOPPLER COMPLETE: CPT

## 2025-06-19 PROCEDURE — 99215 OFFICE O/P EST HI 40 MIN: CPT

## 2025-06-19 PROCEDURE — 93000 ELECTROCARDIOGRAM COMPLETE: CPT

## 2025-07-31 ENCOUNTER — APPOINTMENT (OUTPATIENT)
Dept: CARDIOLOGY | Facility: CLINIC | Age: 40
End: 2025-07-31
Payer: COMMERCIAL

## 2025-07-31 DIAGNOSIS — I25.85 CHRONIC CORONARY MICROVASCULAR DYSFUNCTION: ICD-10-CM

## 2025-07-31 PROCEDURE — 99215 OFFICE O/P EST HI 40 MIN: CPT | Mod: 95

## 2025-08-06 PROBLEM — I25.85 CARDIAC MICROVASCULAR DISEASE: Status: ACTIVE | Noted: 2025-08-06
